# Patient Record
Sex: MALE | Race: WHITE | Employment: OTHER | URBAN - METROPOLITAN AREA
[De-identification: names, ages, dates, MRNs, and addresses within clinical notes are randomized per-mention and may not be internally consistent; named-entity substitution may affect disease eponyms.]

---

## 2018-07-24 ENCOUNTER — HOSPITAL ENCOUNTER (OUTPATIENT)
Dept: GENERAL RADIOLOGY | Age: 58
Discharge: HOME | End: 2018-07-24

## 2018-07-24 DIAGNOSIS — M70.22: ICD-10-CM

## 2018-07-25 ENCOUNTER — HOSPITAL ENCOUNTER (OUTPATIENT)
Dept: GENERAL RADIOLOGY | Age: 58
Discharge: HOME | End: 2018-07-25

## 2018-07-25 ENCOUNTER — TELEPHONE (OUTPATIENT)
Dept: ORTHOPEDIC SURGERY | Age: 58
End: 2018-07-25

## 2018-07-25 LAB
BASOPHILS ABSOLUTE: 0 K/CU MM
BASOPHILS RELATIVE PERCENT: 0.5 % (ref 0–1)
C-REACTIVE PROTEIN, HIGH SENSITIVITY: 35.4 MG/L
DIFFERENTIAL TYPE: ABNORMAL
EOSINOPHILS ABSOLUTE: 0.2 K/CU MM
EOSINOPHILS RELATIVE PERCENT: 2.4 % (ref 0–3)
ERYTHROCYTE SEDIMENTATION RATE: 13 MM/HR (ref 0–20)
HCT VFR BLD CALC: 44.3 % (ref 42–52)
HEMOGLOBIN: 14.5 GM/DL (ref 13.5–18)
IMMATURE NEUTROPHIL %: 0.7 % (ref 0–0.43)
LYMPHOCYTES ABSOLUTE: 1.6 K/CU MM
LYMPHOCYTES RELATIVE PERCENT: 21.5 % (ref 24–44)
MCH RBC QN AUTO: 30.3 PG (ref 27–31)
MCHC RBC AUTO-ENTMCNC: 32.7 % (ref 32–36)
MCV RBC AUTO: 92.7 FL (ref 78–100)
MONOCYTES ABSOLUTE: 0.6 K/CU MM
MONOCYTES RELATIVE PERCENT: 8.1 % (ref 0–4)
NUCLEATED RBC %: 0 %
PDW BLD-RTO: 11.9 % (ref 11.7–14.9)
PLATELET # BLD: 177 K/CU MM (ref 140–440)
PMV BLD AUTO: 11.8 FL (ref 7.5–11.1)
RBC # BLD: 4.78 M/CU MM (ref 4.6–6.2)
SEGMENTED NEUTROPHILS ABSOLUTE COUNT: 5 K/CU MM
SEGMENTED NEUTROPHILS RELATIVE PERCENT: 66.8 % (ref 36–66)
TOTAL IMMATURE NEUTOROPHIL: 0.05 K/CU MM
TOTAL NUCLEATED RBC: 0 K/CU MM
URIC ACID: 4.4 MG/DL (ref 3.5–7.2)
WBC # BLD: 7.5 K/CU MM (ref 4–10.5)

## 2018-07-25 NOTE — TELEPHONE ENCOUNTER
I did not see in the chart that patient was seen in the ED    If the referring provider or the patient thinks it is infected they should go to the ED to get antibiotics     If this is a chronic condition they can be seen next week with TOD Bourgeois

## 2018-08-02 ENCOUNTER — OFFICE VISIT (OUTPATIENT)
Dept: ORTHOPEDIC SURGERY | Age: 58
End: 2018-08-02

## 2018-08-02 VITALS — OXYGEN SATURATION: 97 % | HEART RATE: 70 BPM | TEMPERATURE: 98.5 F

## 2018-08-02 DIAGNOSIS — M70.22 OLECRANON BURSITIS OF LEFT ELBOW: Primary | ICD-10-CM

## 2018-08-02 PROCEDURE — 99204 OFFICE O/P NEW MOD 45 MIN: CPT | Performed by: PHYSICIAN ASSISTANT

## 2018-08-02 RX ORDER — CEFUROXIME AXETIL 500 MG/1
TABLET ORAL
Refills: 0 | COMMUNITY
Start: 2018-07-24 | End: 2022-01-18

## 2018-08-02 RX ORDER — TAMSULOSIN HYDROCHLORIDE 0.4 MG/1
CAPSULE ORAL
Refills: 3 | COMMUNITY
Start: 2018-06-14

## 2018-08-02 RX ORDER — IBUPROFEN 800 MG/1
TABLET ORAL
Refills: 0 | COMMUNITY
Start: 2018-07-24 | End: 2022-01-18

## 2018-08-02 ASSESSMENT — ENCOUNTER SYMPTOMS
EYES NEGATIVE: 1
RESPIRATORY NEGATIVE: 1
GASTROINTESTINAL NEGATIVE: 1

## 2018-08-02 NOTE — PATIENT INSTRUCTIONS
Stop Ibuprofen   Aleve as needed   Add tylenol as needed   Compressive wrap elbow as needed   Avoid direct pressure to elbow   Follow up in 2 weeks if symptoms persist     The patient was advised that NSAID-type medications have two very important potential side effects: gastrointestinal irritation including hemorrhage and renal injuries. He was asked to take the medication with food and to stop if he experiences any GI upset. I asked him to call for vomiting, abdominal pain or black/bloody stools. The patient expresses understanding of these issues and questions were answered.     Add Tylenol (also known as acetaminophen) as needed   Take 2 extra strength (500 mg) or 3 regular strength (325 mg) up to three times a day  It is OK to take Tylenol in conjunction with NSAID's (Advil, Aleve, Naprosyn, etc)  If taking other medications that have acetaminophen ensure total acetaminophen dose for any 24 period is less than 3,000 mg

## 2018-08-02 NOTE — PROGRESS NOTES
CRP/ CBC    Component Results     Component Value Ref Range & Units Status Collected Lab   Sed Rate 13  0 - 20 MM/HR Final 07/25/2018  4:12 PM [address]       Component Results     Component Value Ref Range & Units Status Collected Lab   Uric Acid 4.4  3.5 - 7.2 MG/DL Final 07/25/2018  4:12 PM [facility]      Component Results     Component Value Ref Range & Units Status Collected Lab   CRP High Sensitivity 35.4   <8.5 mg/L Final 07/25/2018  4:12 PM [address]     Component Results     Component Value Ref Range & Units Status Collected Lab   WBC 7.5  4.0 - 10.5 K/CU MM Final 07/25/2018  4:12 PM [facility] Dr LOMELI 4.78  4.6 - 6.2 M/CU MM Final 07/25/2018  4:12 PM [facility]   Hemoglobin 14.5  13.5 - 18.0 GM/DL Final 07/25/2018  4:12 PM [facility]   Hematocrit 44.3  42 - 52 % Final 07/25/2018  4:12 PM [facility]   MCV 92.7  78 - 100 FL Final 07/25/2018  4:12 PM [facility] 30.3  27 - 31 PG Final 07/25/2018  4:12 PM [facility]   MCHC 32.7  32.0 - 36.0 % Final 07/25/2018  4:12 PM [facility]   RDW 11.9  11.7 - 14.9 % Final 07/25/2018  4:12 PM [facility]   Platelets 007  735 - 440 K/CU MM Final 07/25/2018  4:12 PM [facility]    MPV 11.8   7.5 - 11.1 FL Final 07/25/2018  4:12 PM [address]   Differential Type   Final 07/25/2018  4:12 PM [facility]   AUTOMATED DIFFERENTIAL    Segs Relative 66.8   36 - 66 % Final 07/25/2018  4:12 PM [facility]   Lymphocytes % 21.5   24 - 44 % Final 07/25/2018  4:12 PM [facility]   Monocytes % 8.1   0 - 4 % Final 07/25/2018  4:12 PM [address] [place] [address] Dr   Eosinophils % 2.4  0 - 3 % Final 07/25/2018  4:12 PM [facility]   Basophils % 0.5  0 - 1 % Final 07/25/2018  4:12 PM [address]   Segs Absolute 5.0  K/CU MM Final 07/25/2018  4:12 PM [address]   Lymphocytes # 1.6  K/CU MM Final 07/25/2018  4:12 PM [address] [place] [address] Dr   Monocytes # 0.6  K/CU MM Final 07/25/2018  4:12 PM [address]   Eosinophils # 0.2  K/CU MM Final 07/25/2018  4:12 PM [address] [place] [address] Dr   Basophils # 0.0  K/CU MM Final 07/25/2018  4:12 PM [address]   Nucleated RBC % 0.0  % Final 07/25/2018  4:12 PM [address]   Total Nucleated RBC 0.0  K/CU MM Final 07/25/2018  4:12 PM [address]   Total Immature Neutrophil 0.05  K/CU MM Final 07/25/2018  4:12 PM [address]   Immature Neutrophil % 0.7   0 - 0.43 % Final 07/25/2018  4:12 PM [facility]         Impression:  left Elbow olecranon bursitis (Does not appear infected at this point)      Plan:    I explained to the patient that at this point I do not feel that making an incision or sticking a needle in this olecranon bursa is the appropriate thing to do. I felt that this was not infected and that with a bursitis generally these will improve with some compression and NSAIDs and if not he could return to talk to Dr. Boris Christiansen about surgical treatment.     Stop Ibuprofen   Aleve BID   Add tylenol as needed   Compressive wrap elbow as needed   Avoid direct pressure to elbow and avoid repetitive extension-type exercises  Follow up in 2 weeks if symptoms persist     The patient was advised that NSAID-type medications have two very important potential side effects: gastrointestinal irritation including hemorrhage and renal injuries. He was asked to take the medication with food and to stop if he experiences any GI upset. I asked him to call for vomiting, abdominal pain or black/bloody stools. The patient expresses understanding of these issues and questions were answered.     Add Tylenol (also known as acetaminophen) as needed   Take 2 extra strength (500 mg) or 3 regular strength (325 mg) up to three times a day  It is OK to take Tylenol in conjunction with NSAID's (Advil, Aleve, Naprosyn, etc)  If taking other medications that have acetaminophen ensure total acetaminophen dose for any 24 period is less than 3,000 mg

## 2022-01-18 ENCOUNTER — APPOINTMENT (OUTPATIENT)
Dept: GENERAL RADIOLOGY | Age: 62
DRG: 282 | End: 2022-01-18
Payer: COMMERCIAL

## 2022-01-18 ENCOUNTER — HOSPITAL ENCOUNTER (INPATIENT)
Age: 62
LOS: 2 days | Discharge: HOME | DRG: 282 | End: 2022-01-20
Attending: EMERGENCY MEDICINE | Admitting: STUDENT IN AN ORGANIZED HEALTH CARE EDUCATION/TRAINING PROGRAM
Payer: COMMERCIAL

## 2022-01-18 ENCOUNTER — APPOINTMENT (OUTPATIENT)
Dept: NUCLEAR MEDICINE | Age: 62
DRG: 282 | End: 2022-01-18
Payer: COMMERCIAL

## 2022-01-18 DIAGNOSIS — R07.9 ACUTE CHEST PAIN: Primary | ICD-10-CM

## 2022-01-18 PROBLEM — H90.3 SENSORINEURAL HEARING LOSS, BILATERAL: Status: ACTIVE | Noted: 2017-02-09

## 2022-01-18 PROBLEM — I20.0 UNSTABLE ANGINA (HCC): Status: ACTIVE | Noted: 2022-01-18

## 2022-01-18 PROBLEM — H93.19 TINNITUS: Status: ACTIVE | Noted: 2017-02-09

## 2022-01-18 LAB
ALBUMIN SERPL-MCNC: 3.9 GM/DL (ref 3.4–5)
ALP BLD-CCNC: 68 IU/L (ref 40–128)
ALT SERPL-CCNC: 23 U/L (ref 10–40)
ANION GAP SERPL CALCULATED.3IONS-SCNC: 9 MMOL/L (ref 4–16)
AST SERPL-CCNC: 29 IU/L (ref 15–37)
BASOPHILS ABSOLUTE: 0.1 K/CU MM
BASOPHILS RELATIVE PERCENT: 0.6 % (ref 0–1)
BILIRUB SERPL-MCNC: 0.2 MG/DL (ref 0–1)
BUN BLDV-MCNC: 27 MG/DL (ref 6–23)
CALCIUM SERPL-MCNC: 8.9 MG/DL (ref 8.3–10.6)
CHLORIDE BLD-SCNC: 103 MMOL/L (ref 99–110)
CHOLESTEROL: 180 MG/DL
CO2: 25 MMOL/L (ref 21–32)
CREAT SERPL-MCNC: 1.1 MG/DL (ref 0.9–1.3)
DIFFERENTIAL TYPE: ABNORMAL
EKG ATRIAL RATE: 48 BPM
EKG ATRIAL RATE: 52 BPM
EKG DIAGNOSIS: NORMAL
EKG DIAGNOSIS: NORMAL
EKG P AXIS: 36 DEGREES
EKG P AXIS: 48 DEGREES
EKG P-R INTERVAL: 176 MS
EKG P-R INTERVAL: 176 MS
EKG Q-T INTERVAL: 436 MS
EKG Q-T INTERVAL: 438 MS
EKG QRS DURATION: 78 MS
EKG QRS DURATION: 82 MS
EKG QTC CALCULATION (BAZETT): 391 MS
EKG QTC CALCULATION (BAZETT): 405 MS
EKG R AXIS: 20 DEGREES
EKG R AXIS: 35 DEGREES
EKG T AXIS: 36 DEGREES
EKG T AXIS: 58 DEGREES
EKG VENTRICULAR RATE: 48 BPM
EKG VENTRICULAR RATE: 52 BPM
EOSINOPHILS ABSOLUTE: 0.2 K/CU MM
EOSINOPHILS RELATIVE PERCENT: 2.9 % (ref 0–3)
ESTIMATED AVERAGE GLUCOSE: 100 MG/DL
GFR AFRICAN AMERICAN: >60 ML/MIN/1.73M2
GFR NON-AFRICAN AMERICAN: >60 ML/MIN/1.73M2
GLUCOSE BLD-MCNC: 113 MG/DL (ref 70–99)
HBA1C MFR BLD: 5.1 % (ref 4.2–6.3)
HCT VFR BLD CALC: 44.4 % (ref 42–52)
HDLC SERPL-MCNC: 52 MG/DL
HEMOGLOBIN: 14.9 GM/DL (ref 13.5–18)
IMMATURE NEUTROPHIL %: 0.4 % (ref 0–0.43)
LDL CHOLESTEROL DIRECT: 113 MG/DL
LIPASE: 31 IU/L (ref 13–60)
LV EF: 53 %
LV EF: 60 %
LVEF MODALITY: NORMAL
LVEF MODALITY: NORMAL
LYMPHOCYTES ABSOLUTE: 1.9 K/CU MM
LYMPHOCYTES RELATIVE PERCENT: 23.7 % (ref 24–44)
MCH RBC QN AUTO: 29.9 PG (ref 27–31)
MCHC RBC AUTO-ENTMCNC: 33.6 % (ref 32–36)
MCV RBC AUTO: 89 FL (ref 78–100)
MONOCYTES ABSOLUTE: 0.5 K/CU MM
MONOCYTES RELATIVE PERCENT: 6.7 % (ref 0–4)
NUCLEATED RBC %: 0 %
PDW BLD-RTO: 12.7 % (ref 11.7–14.9)
PLATELET # BLD: 161 K/CU MM (ref 140–440)
PMV BLD AUTO: 11.9 FL (ref 7.5–11.1)
POTASSIUM SERPL-SCNC: 3.7 MMOL/L (ref 3.5–5.1)
RBC # BLD: 4.99 M/CU MM (ref 4.6–6.2)
SARS-COV-2, NAAT: NOT DETECTED
SEGMENTED NEUTROPHILS ABSOLUTE COUNT: 5.2 K/CU MM
SEGMENTED NEUTROPHILS RELATIVE PERCENT: 65.7 % (ref 36–66)
SODIUM BLD-SCNC: 137 MMOL/L (ref 135–145)
SOURCE: NORMAL
TOTAL IMMATURE NEUTOROPHIL: 0.03 K/CU MM
TOTAL NUCLEATED RBC: 0 K/CU MM
TOTAL PROTEIN: 6.1 GM/DL (ref 6.4–8.2)
TRIGL SERPL-MCNC: 87 MG/DL
TROPONIN T: 0.02 NG/ML
TROPONIN T: <0.01 NG/ML
WBC # BLD: 7.9 K/CU MM (ref 4–10.5)

## 2022-01-18 PROCEDURE — 78452 HT MUSCLE IMAGE SPECT MULT: CPT

## 2022-01-18 PROCEDURE — 3430000000 HC RX DIAGNOSTIC RADIOPHARMACEUTICAL: Performed by: INTERNAL MEDICINE

## 2022-01-18 PROCEDURE — 84484 ASSAY OF TROPONIN QUANT: CPT

## 2022-01-18 PROCEDURE — 83721 ASSAY OF BLOOD LIPOPROTEIN: CPT

## 2022-01-18 PROCEDURE — A9500 TC99M SESTAMIBI: HCPCS | Performed by: INTERNAL MEDICINE

## 2022-01-18 PROCEDURE — C1769 GUIDE WIRE: HCPCS

## 2022-01-18 PROCEDURE — 83036 HEMOGLOBIN GLYCOSYLATED A1C: CPT

## 2022-01-18 PROCEDURE — 96366 THER/PROPH/DIAG IV INF ADDON: CPT

## 2022-01-18 PROCEDURE — 6360000002 HC RX W HCPCS: Performed by: INTERNAL MEDICINE

## 2022-01-18 PROCEDURE — 6360000002 HC RX W HCPCS

## 2022-01-18 PROCEDURE — 6370000000 HC RX 637 (ALT 250 FOR IP): Performed by: EMERGENCY MEDICINE

## 2022-01-18 PROCEDURE — C1887 CATHETER, GUIDING: HCPCS

## 2022-01-18 PROCEDURE — 83690 ASSAY OF LIPASE: CPT

## 2022-01-18 PROCEDURE — 2709999900 HC NON-CHARGEABLE SUPPLY

## 2022-01-18 PROCEDURE — 85025 COMPLETE CBC W/AUTO DIFF WBC: CPT

## 2022-01-18 PROCEDURE — 2500000003 HC RX 250 WO HCPCS: Performed by: EMERGENCY MEDICINE

## 2022-01-18 PROCEDURE — B2111ZZ FLUOROSCOPY OF MULTIPLE CORONARY ARTERIES USING LOW OSMOLAR CONTRAST: ICD-10-PCS | Performed by: INTERNAL MEDICINE

## 2022-01-18 PROCEDURE — 80053 COMPREHEN METABOLIC PANEL: CPT

## 2022-01-18 PROCEDURE — 71045 X-RAY EXAM CHEST 1 VIEW: CPT

## 2022-01-18 PROCEDURE — 93306 TTE W/DOPPLER COMPLETE: CPT

## 2022-01-18 PROCEDURE — 80061 LIPID PANEL: CPT

## 2022-01-18 PROCEDURE — 93010 ELECTROCARDIOGRAM REPORT: CPT | Performed by: INTERNAL MEDICINE

## 2022-01-18 PROCEDURE — 96365 THER/PROPH/DIAG IV INF INIT: CPT

## 2022-01-18 PROCEDURE — 93005 ELECTROCARDIOGRAM TRACING: CPT | Performed by: EMERGENCY MEDICINE

## 2022-01-18 PROCEDURE — 99222 1ST HOSP IP/OBS MODERATE 55: CPT | Performed by: INTERNAL MEDICINE

## 2022-01-18 PROCEDURE — 99285 EMERGENCY DEPT VISIT HI MDM: CPT

## 2022-01-18 PROCEDURE — 6360000004 HC RX CONTRAST MEDICATION

## 2022-01-18 PROCEDURE — 93454 CORONARY ARTERY ANGIO S&I: CPT | Performed by: INTERNAL MEDICINE

## 2022-01-18 PROCEDURE — 2580000003 HC RX 258: Performed by: INTERNAL MEDICINE

## 2022-01-18 PROCEDURE — 6370000000 HC RX 637 (ALT 250 FOR IP): Performed by: STUDENT IN AN ORGANIZED HEALTH CARE EDUCATION/TRAINING PROGRAM

## 2022-01-18 PROCEDURE — 87635 SARS-COV-2 COVID-19 AMP PRB: CPT

## 2022-01-18 PROCEDURE — 6370000000 HC RX 637 (ALT 250 FOR IP): Performed by: INTERNAL MEDICINE

## 2022-01-18 PROCEDURE — 93017 CV STRESS TEST TRACING ONLY: CPT

## 2022-01-18 PROCEDURE — C1894 INTRO/SHEATH, NON-LASER: HCPCS

## 2022-01-18 PROCEDURE — 93005 ELECTROCARDIOGRAM TRACING: CPT | Performed by: INTERNAL MEDICINE

## 2022-01-18 PROCEDURE — 94761 N-INVAS EAR/PLS OXIMETRY MLT: CPT

## 2022-01-18 PROCEDURE — 93454 CORONARY ARTERY ANGIO S&I: CPT

## 2022-01-18 PROCEDURE — 2500000003 HC RX 250 WO HCPCS

## 2022-01-18 PROCEDURE — 2580000003 HC RX 258: Performed by: STUDENT IN AN ORGANIZED HEALTH CARE EDUCATION/TRAINING PROGRAM

## 2022-01-18 PROCEDURE — 2140000000 HC CCU INTERMEDIATE R&B

## 2022-01-18 RX ORDER — ONDANSETRON 2 MG/ML
4 INJECTION INTRAMUSCULAR; INTRAVENOUS EVERY 6 HOURS PRN
Status: DISCONTINUED | OUTPATIENT
Start: 2022-01-18 | End: 2022-01-20 | Stop reason: HOSPADM

## 2022-01-18 RX ORDER — TAMSULOSIN HYDROCHLORIDE 0.4 MG/1
0.4 CAPSULE ORAL DAILY
Status: DISCONTINUED | OUTPATIENT
Start: 2022-01-18 | End: 2022-01-20 | Stop reason: HOSPADM

## 2022-01-18 RX ORDER — HEPARIN SODIUM 10000 [USP'U]/100ML
1000 INJECTION, SOLUTION INTRAVENOUS CONTINUOUS
Status: DISCONTINUED | OUTPATIENT
Start: 2022-01-18 | End: 2022-01-19

## 2022-01-18 RX ORDER — SODIUM CHLORIDE 0.9 % (FLUSH) 0.9 %
5-40 SYRINGE (ML) INJECTION EVERY 12 HOURS SCHEDULED
Status: DISCONTINUED | OUTPATIENT
Start: 2022-01-18 | End: 2022-01-20 | Stop reason: HOSPADM

## 2022-01-18 RX ORDER — ACETAMINOPHEN 325 MG/1
650 TABLET ORAL EVERY 6 HOURS PRN
Status: DISCONTINUED | OUTPATIENT
Start: 2022-01-18 | End: 2022-01-18 | Stop reason: SDUPTHER

## 2022-01-18 RX ORDER — MAGNESIUM HYDROXIDE/ALUMINUM HYDROXICE/SIMETHICONE 120; 1200; 1200 MG/30ML; MG/30ML; MG/30ML
30 SUSPENSION ORAL ONCE
Status: COMPLETED | OUTPATIENT
Start: 2022-01-18 | End: 2022-01-18

## 2022-01-18 RX ORDER — ASPIRIN 81 MG/1
81 TABLET, CHEWABLE ORAL DAILY
Status: DISCONTINUED | OUTPATIENT
Start: 2022-01-19 | End: 2022-01-20 | Stop reason: HOSPADM

## 2022-01-18 RX ORDER — POLYETHYLENE GLYCOL 3350 17 G/17G
17 POWDER, FOR SOLUTION ORAL DAILY PRN
Status: DISCONTINUED | OUTPATIENT
Start: 2022-01-18 | End: 2022-01-20 | Stop reason: HOSPADM

## 2022-01-18 RX ORDER — ACETAMINOPHEN 325 MG/1
650 TABLET ORAL EVERY 4 HOURS PRN
Status: DISCONTINUED | OUTPATIENT
Start: 2022-01-18 | End: 2022-01-20 | Stop reason: HOSPADM

## 2022-01-18 RX ORDER — NITROGLYCERIN 20 MG/100ML
5-200 INJECTION INTRAVENOUS CONTINUOUS
Status: DISCONTINUED | OUTPATIENT
Start: 2022-01-18 | End: 2022-01-20 | Stop reason: HOSPADM

## 2022-01-18 RX ORDER — MORPHINE SULFATE 2 MG/ML
1 INJECTION, SOLUTION INTRAMUSCULAR; INTRAVENOUS EVERY 4 HOURS PRN
Status: DISCONTINUED | OUTPATIENT
Start: 2022-01-18 | End: 2022-01-20 | Stop reason: HOSPADM

## 2022-01-18 RX ORDER — ASPIRIN 81 MG/1
81 TABLET, CHEWABLE ORAL DAILY
Status: DISCONTINUED | OUTPATIENT
Start: 2022-01-19 | End: 2022-01-18 | Stop reason: SDUPTHER

## 2022-01-18 RX ORDER — SODIUM CHLORIDE 0.9 % (FLUSH) 0.9 %
5-40 SYRINGE (ML) INJECTION EVERY 12 HOURS SCHEDULED
Status: DISCONTINUED | OUTPATIENT
Start: 2022-01-18 | End: 2022-01-18 | Stop reason: SDUPTHER

## 2022-01-18 RX ORDER — HEPARIN SODIUM 10000 [USP'U]/100ML
1000 INJECTION, SOLUTION INTRAVENOUS CONTINUOUS
Status: DISCONTINUED | OUTPATIENT
Start: 2022-01-18 | End: 2022-01-18 | Stop reason: SDUPTHER

## 2022-01-18 RX ORDER — SODIUM CHLORIDE 0.9 % (FLUSH) 0.9 %
5-40 SYRINGE (ML) INJECTION PRN
Status: DISCONTINUED | OUTPATIENT
Start: 2022-01-18 | End: 2022-01-18 | Stop reason: SDUPTHER

## 2022-01-18 RX ORDER — SODIUM CHLORIDE 9 MG/ML
INJECTION, SOLUTION INTRAVENOUS CONTINUOUS
Status: DISCONTINUED | OUTPATIENT
Start: 2022-01-18 | End: 2022-01-19

## 2022-01-18 RX ORDER — ONDANSETRON 4 MG/1
4 TABLET, ORALLY DISINTEGRATING ORAL EVERY 8 HOURS PRN
Status: DISCONTINUED | OUTPATIENT
Start: 2022-01-18 | End: 2022-01-20 | Stop reason: HOSPADM

## 2022-01-18 RX ORDER — SODIUM CHLORIDE 0.9 % (FLUSH) 0.9 %
5-40 SYRINGE (ML) INJECTION PRN
Status: DISCONTINUED | OUTPATIENT
Start: 2022-01-18 | End: 2022-01-20 | Stop reason: HOSPADM

## 2022-01-18 RX ORDER — FAMOTIDINE 20 MG/1
20 TABLET, FILM COATED ORAL ONCE
Status: COMPLETED | OUTPATIENT
Start: 2022-01-18 | End: 2022-01-18

## 2022-01-18 RX ORDER — AMINOPHYLLINE DIHYDRATE 25 MG/ML
75 INJECTION, SOLUTION INTRAVENOUS ONCE
Status: COMPLETED | OUTPATIENT
Start: 2022-01-18 | End: 2022-01-18

## 2022-01-18 RX ORDER — ATORVASTATIN CALCIUM 80 MG/1
80 TABLET, FILM COATED ORAL NIGHTLY
Status: DISCONTINUED | OUTPATIENT
Start: 2022-01-18 | End: 2022-01-20 | Stop reason: HOSPADM

## 2022-01-18 RX ORDER — ACETAMINOPHEN 650 MG/1
650 SUPPOSITORY RECTAL EVERY 6 HOURS PRN
Status: DISCONTINUED | OUTPATIENT
Start: 2022-01-18 | End: 2022-01-20 | Stop reason: HOSPADM

## 2022-01-18 RX ORDER — SODIUM CHLORIDE 9 MG/ML
25 INJECTION, SOLUTION INTRAVENOUS PRN
Status: DISCONTINUED | OUTPATIENT
Start: 2022-01-18 | End: 2022-01-18 | Stop reason: SDUPTHER

## 2022-01-18 RX ORDER — HEPARIN SODIUM 10000 [USP'U]/100ML
1000 INJECTION, SOLUTION INTRAVENOUS CONTINUOUS
Status: DISCONTINUED | OUTPATIENT
Start: 2022-01-18 | End: 2022-01-18

## 2022-01-18 RX ORDER — SODIUM CHLORIDE 9 MG/ML
25 INJECTION, SOLUTION INTRAVENOUS PRN
Status: DISCONTINUED | OUTPATIENT
Start: 2022-01-18 | End: 2022-01-20 | Stop reason: HOSPADM

## 2022-01-18 RX ADMIN — HEPARIN SODIUM AND DEXTROSE 1000 UNITS/HR: 10000; 5 INJECTION INTRAVENOUS at 16:32

## 2022-01-18 RX ADMIN — TIROFIBAN 0.15 MCG/KG/MIN: 5 INJECTION, SOLUTION INTRAVENOUS at 16:26

## 2022-01-18 RX ADMIN — SODIUM CHLORIDE, PRESERVATIVE FREE 10 ML: 5 INJECTION INTRAVENOUS at 09:03

## 2022-01-18 RX ADMIN — NITROGLYCERIN 5 MCG/MIN: 20 INJECTION INTRAVENOUS at 04:44

## 2022-01-18 RX ADMIN — ALUMINUM HYDROXIDE, MAGNESIUM HYDROXIDE, AND SIMETHICONE 30 ML: 200; 200; 20 SUSPENSION ORAL at 03:46

## 2022-01-18 RX ADMIN — KIT FOR THE PREPARATION OF TECHNETIUM TC99M SESTAMIBI 30 MILLICURIE: 1 INJECTION, POWDER, LYOPHILIZED, FOR SOLUTION PARENTERAL at 08:50

## 2022-01-18 RX ADMIN — FAMOTIDINE 20 MG: 20 TABLET, FILM COATED ORAL at 03:46

## 2022-01-18 RX ADMIN — KIT FOR THE PREPARATION OF TECHNETIUM TC99M SESTAMIBI 10 MILLICURIE: 1 INJECTION, POWDER, LYOPHILIZED, FOR SOLUTION PARENTERAL at 07:15

## 2022-01-18 RX ADMIN — ONDANSETRON 4 MG: 4 TABLET, ORALLY DISINTEGRATING ORAL at 06:22

## 2022-01-18 RX ADMIN — SODIUM CHLORIDE: 9 INJECTION, SOLUTION INTRAVENOUS at 16:25

## 2022-01-18 RX ADMIN — ATORVASTATIN CALCIUM 80 MG: 80 TABLET, FILM COATED ORAL at 20:07

## 2022-01-18 RX ADMIN — AMINOPHYLLINE 75 MG: 25 INJECTION, SOLUTION INTRAVENOUS at 08:50

## 2022-01-18 RX ADMIN — TIROFIBAN 0.15 MCG/KG/MIN: 5 INJECTION, SOLUTION INTRAVENOUS at 18:36

## 2022-01-18 RX ADMIN — REGADENOSON 0.4 MG: 0.08 INJECTION, SOLUTION INTRAVENOUS at 08:46

## 2022-01-18 ASSESSMENT — PAIN SCALES - GENERAL
PAINLEVEL_OUTOF10: 5
PAINLEVEL_OUTOF10: 0

## 2022-01-18 ASSESSMENT — ENCOUNTER SYMPTOMS
DIARRHEA: 0
COUGH: 0
CHEST TIGHTNESS: 0
SHORTNESS OF BREATH: 1
CHEST TIGHTNESS: 1
COLOR CHANGE: 0
NAUSEA: 1
NAUSEA: 0
ABDOMINAL PAIN: 0
RECTAL PAIN: 0
RHINORRHEA: 0
SORE THROAT: 0
BLOOD IN STOOL: 0
VOMITING: 0
WHEEZING: 0

## 2022-01-18 ASSESSMENT — PAIN DESCRIPTION - ORIENTATION: ORIENTATION: MID

## 2022-01-18 ASSESSMENT — HEART SCORE: ECG: 0

## 2022-01-18 ASSESSMENT — PAIN DESCRIPTION - FREQUENCY: FREQUENCY: CONTINUOUS

## 2022-01-18 ASSESSMENT — PAIN DESCRIPTION - LOCATION: LOCATION: CHEST

## 2022-01-18 NOTE — H&P
History and Physical      Name:  Laura Delarosa /Age/Sex: 1960  (64 y.o. male)   MRN & CSN:  [id number] & [id number] Admission Date/Time: 2022  2:56 AM   Location:  [place] [address] PCP: No primary care provider on file. Hospital Day: 1    Assessment and Plan:   Laura Delarosa is a 64 y.o.  male  who presents with Unstable angina ([facility]    1. Chest pain concerning for unstable angina, rule out acute coronary syndrome, without h/o coronary artery disease  -Admit to observation unit with telemetry  -Story concerning for unstable angina  -Troponin <0.010 in ED, Cycle troponin x2 q6  -Start patient on aspirin and statin  -Check lipid panel and hemoglobin A1c  -Continue nitro infusion from ED  -Morphine as needed pain  -Nurse communication to notify if any continued chest pain after nitro infusion  -Nasal cannula oxygen prn  -N. p.o and strict bedrest  -2D Echo in AM  -Consult cardiology, appreciate recommendations    Other chronic medical conditions:   Continue all home meds except stated above or contraindicated.  BPH    Diet Diet NPO   DVT Prophylaxis [x] Lovenox, []  Heparin, [] SCDs, [] Ambulation   GI Prophylaxis [] PPI,  [] H2 Blocker,  [] Carafate,  [] Diet/Tube Feeds   Code Status Full Code   Disposition Patient requires continued admission due to Unstable angina ([facility]   MDM [] Low, [] Moderate,[x]  High  Patient's risk as above due to acuity of condition with potential for decompensation. History of Present Illness:     Chief Complaint: Unstable angina ([facility]    Laura Delarosa is a 64 y.o.  male with a reviewed and a noncontributory family history and a PMH as stated above, who presents with complaints of chest pain. Onset was 4 hours PTA, with unchanged course since that time. The patient describes the pain as constant, pressure like in nature, radiates to the left arm and right arm. Patient rates pain as a 6/10 in intensity.   Associated symptoms are dyspnea and diaphresis. Aggravating factors are none. Mild alleviating factors are: nitroglycerin 1 tablets and NSAIDs. Patient's cardiac risk factors are advanced age (older than 54 for men, 72 for women) and dyslipidemia. Patient's risk factors for DVT/PE: none. Previous cardiac testing: Patient states he had a stress test \"many years ago, maybe like 10, because I wanted to start going running and it was completely normal.\"  Otherwise patient denies fevers, cough, abdominal pain, nausea, vomiting, diarrhea, melena, hematochezia, dysuria, frequency, or urgency. Discussed case with ED provider. ROS:   Review of Systems   Constitutional: Positive for diaphoresis. Negative for appetite change, fatigue and fever. HENT: Negative for congestion, rhinorrhea and sore throat. Eyes: Negative for visual disturbance. Respiratory: Positive for chest tightness and shortness of breath. Negative for cough and wheezing. Cardiovascular: Positive for chest pain. Negative for palpitations and leg swelling. Gastrointestinal: Negative for abdominal pain, blood in stool, diarrhea, nausea, rectal pain and vomiting. Genitourinary: Negative for dysuria, frequency, hematuria and urgency. Musculoskeletal: Negative for arthralgias. Skin: Negative for color change and rash. Neurological: Negative for dizziness, seizures, weakness, numbness and headaches. Psychiatric/Behavioral: Negative for confusion. Objective:   No intake or output data in the 24 hours ending 01/18/22 0504   Vitals:   Vitals:    01/18/22 0348   BP: 118/75   Pulse: 57   Resp: 18   Temp:    SpO2: 99%     /75   Pulse 57   Temp 98.4 °F (36.9 °C) (Oral)   Resp 18   Ht 5' 6\" (1.676 m)   Wt 160 lb (72.6 kg)   SpO2 99%   BMI 25.82 kg/m²   Physical Exam:   Physical Exam  Vitals and nursing note reviewed. Constitutional:       General: He is awake. He is not in acute distress. Appearance: Normal appearance. He is normal weight.  He is ill-appearing and diaphoretic. He is not toxic-appearing. Interventions: He is not intubated. Comments: Appears uncomfortable and in pain   HENT:      Head: Atraumatic. Right Ear: External ear normal.      Left Ear: External ear normal.      Nose: Nose normal. No rhinorrhea. Mouth/Throat:      Mouth: Mucous membranes are moist.   Eyes:      General: No scleral icterus. Conjunctiva/sclera: Conjunctivae normal.   Cardiovascular:      Rate and Rhythm: Regular rhythm. Bradycardia present. Pulses: Normal pulses. Heart sounds: Normal heart sounds. No murmur heard. No gallop. Pulmonary:      Effort: Pulmonary effort is normal. No tachypnea or respiratory distress. He is not intubated. Breath sounds: Normal breath sounds. No wheezing, rhonchi or rales. Abdominal:      General: Abdomen is flat. Bowel sounds are normal. There is no distension. Palpations: Abdomen is soft. Tenderness: There is no abdominal tenderness. There is no guarding. Musculoskeletal:         General: Normal range of motion. Cervical back: Neck supple. Right lower leg: No edema. Left lower leg: No edema. Skin:     General: Skin is cool and moist.      Capillary Refill: Capillary refill takes less than 2 seconds. Neurological:      Mental Status: He is alert and oriented to person, place, and time. Mental status is at baseline. Cranial Nerves: No dysarthria or facial asymmetry. Motor: No tremor or seizure activity. Psychiatric:         Attention and Perception: He is attentive. Mood and Affect: Mood is anxious. Speech: He is communicative. Behavior: Behavior is cooperative. Past Medical History:      Past Medical History:   Diagnosis Date    BPH (benign prostatic hyperplasia)     HLD (hyperlipidemia)      PSHX:  has a past surgical history that includes knee surgery.     Allergies: No Known Allergies    FAM HX: family history includes Clotting Disorder in his father; Dementia in his mother; Prostate Cancer in his father. Soc HX:   Social History     Socioeconomic History    Marital status: Single     Spouse name: None    Number of children: None    Years of education: None    Highest education level: None   Occupational History    None   Tobacco Use    Smoking status: Never Smoker    Smokeless tobacco: Never Used   Substance and Sexual Activity    Alcohol use: Not Currently     Alcohol/week: 3.0 standard drinks     Types: 3 Cans of beer per week    Drug use: Never    Sexual activity: None   Other Topics Concern    None   Social History Narrative    None     Social Determinants of Health     Financial Resource Strain:     Difficulty of Paying Living Expenses: Not on file   Food Insecurity:     Worried About Running Out of Food in the Last Year: Not on file    Susy of Food in the Last Year: Not on file   Transportation Needs:     Lack of Transportation (Medical): Not on file    Lack of Transportation (Non-Medical):  Not on file   Physical Activity:     Days of Exercise per Week: Not on file    Minutes of Exercise per Session: Not on file   Stress:     Feeling of Stress : Not on file   Social Connections:     Frequency of Communication with Friends and Family: Not on file    Frequency of Social Gatherings with Friends and Family: Not on file    Attends Jainism Services: Not on file    Active Member of [address] [place] [address] Here@ Networks or Organizations: Not on file    Attends Club or Organization Meetings: Not on file    Marital Status: Not on file   Intimate Partner Violence:     Fear of Current or Ex-Partner: Not on file    Emotionally Abused: Not on file    Physically Abused: Not on file    Sexually Abused: Not on file   Housing Stability:     Unable to Pay for Housing in the Last Year: Not on file    Number of [place] in the Last Year: Not on file    Unstable Housing in the Last Year: Not on file       Data:   CBC with Differential:    Lab Results   Component Value Date    WBC 7.9 01/18/2022    RBC 4.99 01/18/2022    HGB 14.9 01/18/2022    HCT 44.4 01/18/2022     01/18/2022    MCV 89.0 01/18/2022    MCH 29.9 01/18/2022    MCHC 33.6 01/18/2022    RDW 12.7 01/18/2022    SEGSPCT 65.7 01/18/2022    LYMPHOPCT 23.7 01/18/2022    MONOPCT 6.7 01/18/2022    BASOPCT 0.6 01/18/2022    MONOSABS 0.5 01/18/2022    LYMPHSABS 1.9 01/18/2022    EOSABS 0.2 01/18/2022    BASOSABS 0.1 01/18/2022    DIFFTYPE AUTOMATED DIFFERENTIAL 01/18/2022       CMP:     Lab Results   Component Value Date     01/18/2022    K 3.7 01/18/2022     01/18/2022    CO2 25 01/18/2022    BUN 27 01/18/2022    CREATININE 1.1 01/18/2022    GFRAA >60 01/18/2022    LABGLOM >60 01/18/2022    GLUCOSE 113 01/18/2022    PROT 6.1 01/18/2022    LABALBU 3.9 01/18/2022    CALCIUM 8.9 01/18/2022    BILITOT 0.2 01/18/2022    ALKPHOS 68 01/18/2022    AST 29 01/18/2022    ALT 23 01/18/2022       Troponin:  Lab Results   Component Value Date    TROPONINT <0.010 01/18/2022     Radiology results:  XR CHEST PORTABLE   Preliminary Result   1. Opacity adjacent to the right heart border which may represent infection   or atelectasis. Medications:   Home Medications:   Prior to Admission medications    Medication Sig Start Date End Date Taking?  Authorizing Provider   cefUROXime (CEFTIN) 500 MG tablet TAKE 1 TABLET BY MOUTH TWICE A DAY 7/24/18   Historical Provider, MD   tamsulosin (FLOMAX) 0.4 MG capsule TAKE ONE CAPSULE BY MOUTH 1/2 HOUR FOLLOWING THE SAME MEAL EACH DAY 6/14/18   Historical Provider, MD   ibuprofen (ADVIL;MOTRIN) 800 MG tablet TAKE 1 TABLET BY MOUTH EVERY 6 TO 8 HOURS AS NEEDED WITH FOOD 7/24/18   Historical Provider, MD     Medications:    tamsulosin  0.4 mg Oral Daily    sodium chloride flush  5-40 mL IntraVENous 2 times per day    [START ON 1/19/2022] aspirin  81 mg Oral Daily    atorvastatin  80 mg Oral Nightly    enoxaparin  40 mg SubCUTAneous Daily      Infusions:    nitroGLYCERIN 5 mcg/min (01/18/22 0444)    sodium chloride       PRN Meds: sodium chloride flush, 5-40 mL, PRN  sodium chloride, 25 mL, PRN  ondansetron, 4 mg, Q8H PRN   Or  ondansetron, 4 mg, Q6H PRN  acetaminophen, 650 mg, Q6H PRN   Or  acetaminophen, 650 mg, Q6H PRN  polyethylene glycol, 17 g, Daily PRN  morphine, 1 mg, Q4H PRN        Electronically signed by Aicha Smart DO on 1/18/2022 at 5:04 AM      This dictation was created with voice recognition software. While attempts have been made to review the dictation as it is transcribed, on occasion the spoken word can be misinterpreted by the technology leading to omissions or inappropriate words, phrases or sentences.

## 2022-01-18 NOTE — PROGRESS NOTES
ANEURYSMAL ARTERIES  LAD 50% MID  RCA ANEURYSMAL  PL FILL FROM [facility]LL[facility]LS    AGGRASTAT AND HEPARIN  RECHECK IN AM

## 2022-01-18 NOTE — CONSULTS
Chart reviewed  Full note to follow  Pt has been having CP  Trop mildly elevated  cardiolite ?  Inf wall ischemia  [facility] today  Risks, benefits and alternatives explained to pt  Consent obtained

## 2022-01-18 NOTE — PROGRESS NOTES
Hospitalist Progress Note      Name:  Bakari Coleman /Age/Sex: 1960  (64 y.o. male)   MRN & CSN:  [id number] & [id number] Admission Date/Time: 2022  2:56 AM   Location:  [address] [place] [address] PCP: No primary care provider on file. Hospital Day: 1    Assessment and Plan:   Bakari Coleman is a 64 y.o.  male  who presents with Unstable angina ([address]    1. Chest pain  1. Troponin () 0.010--->0.022  2. EKG shows sinus bradycardia  3. Echo () shows an EF of 50-55% with mild mitral regurgitation  4. Stress test () shows a small sized defect of mild severity which is reversible involving lateral wall of mycardium. abnormal lexiscan nuclear scintigraphic study suggestive of abnormal myocardial perfusion. Mild degree of lateral wall ischemia noted involving a small sized area of left ventricular myocardium  5. LHC () shows severe PL-RCA disease, 50% MID LAD, anuerysmal arteries   6. Repeat LHC on   7. Aggrestat and Heparin drip     2. Hyperlipidemia        1. Continue lipitor     Diet ADULT DIET; Regular; Low Fat/Low Chol/High Fiber/ABAHY   DVT Prophylaxis [] Lovenox, []  Heparin, [] SCDs, [] Ambulation   GI Prophylaxis [] PPI,  [] H2 Blocker,  [] Carafate,  [] Diet/Tube Feeds   Code Status Full Code   Disposition Patient requires continued admission due to ongoing cardiac work up         History of Present Illness:     Chief Complaint: Unstable angina ([address]  Bakari Coleman is a 64 y.o.  male  who presents with chest pain in his chest upon waking up at 0300. Pt states that he woke up with the pain and then had a cold sweat. 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 Pt denies being a smoker, denies any alcohol or any drug use.       Ten point ROS reviewed negative, unless as noted above    Objective:   No intake or output data in the 24 hours ending 22 1550   Vitals:   Vitals:    22 1441   BP: 97/63   Pulse: 55 Resp: 15   Temp: 98.3 °F (36.8 °C)   SpO2: 99%     Physical Exam:   GEN Awake male, sitting upright in bed in no apparent distress. Appears given age. EYES Pupils are equally round. No scleral erythema, discharge, or conjunctivitis. HENT Mucous membranes are moist. Oral pharynx without exudates, no evidence of thrush. NECK Supple, no apparent thyromegaly or masses. RESP Clear to auscultation, no wheezes, rales or rhonchi. Symmetric chest movement while on room air. CARDIO/VASC S1/S2 auscultated. Regular rate without appreciable murmurs, rubs, or gallops. No JVD or carotid bruits. Peripheral pulses equal bilaterally and palpable. No peripheral edema. GI Abdomen is soft without significant tenderness, masses, or guarding. Bowel sounds are normoactive. Rectal exam deferred.  No costovertebral angle tenderness. Damian catheter is not present. HEME/LYMPH No palpable cervical lymphadenopathy and no hepatosplenomegaly. No petechiae or ecchymoses. MSK No gross joint deformities. SKIN Normal coloration, warm, dry. Arm board and kerlex to right wrist, dressing is intact and dry. NEURO Cranial nerves appear grossly intact, normal speech, no lateralizing weakness. PSYCH Awake, alert, oriented x 4. Affect appropriate.     Medications:   Medications:    tamsulosin  0.4 mg Oral Daily    atorvastatin  80 mg Oral Nightly    sodium chloride flush  5-40 mL IntraVENous 2 times per day    metoprolol tartrate  25 mg Oral BID    [START ON 1/19/2022] aspirin  81 mg Oral Daily    tirofiban  25 mcg/kg IntraVENous Once      Infusions:    nitroGLYCERIN Stopped (01/18/22 0629)    tirofiban      sodium chloride      sodium chloride      tirofiban      heparin (PORCINE) Infusion       PRN Meds: ondansetron, 4 mg, Q8H PRN   Or  ondansetron, 4 mg, Q6H PRN  acetaminophen, 650 mg, Q6H PRN  polyethylene glycol, 17 g, Daily PRN  morphine, 1 mg, Q4H PRN  sodium chloride flush, 5-40 mL, PRN  sodium chloride, 25 mL, PRN  acetaminophen, 650 mg, Q4H PRN          Patient is still admitted because ongoing cardiac work up. The anticipated discharge is in greater than 24 hours.      Electronically signed by ANGÉLICA Kebede CNP on 1/18/2022 at 3:50 PM

## 2022-01-18 NOTE — CONSULTS
Adeel Saez [address], [address] [facility],Third Floor  Phone: [phone]    Fax [phone]                  Ronnie Mora MD, Leda Mancilla MD, [address], MD, MD Ezra Alexander MD Burnette Opoka, MD Gem Munoz, MD Tommie Shepherd, ANGÉLICA Mcleod, APRN Romayne Areola, APRDIAZ Guerrero, APRN  Pool Rodrigeuz PA-C    CARDIOLOGY CONSULT NOTE     Reason for consultation: Chest pain rule out ACS    Referring physician:  Triny Martienz DO     Primary care physician: No primary care provider on file. Thank you for the consult. Chief Complaints :  Chief Complaint   Patient presents with    Chest Pain        History of present illness:Clark is a 64 y. o.year old occasion male with a past medical history of hyperlipidemia. Patient presented to the emergency department by squad due to chest pain, shortness of breath, nausea, and cold sweats. Patient stated that he began to experience some chest pain at 130/200 in the morning that woke him up and he rated it as a 6 out of 10. The pain was fairly constant and centralized. The pain did not radiate anywhere. The pain was worsening with activity and was feeling better with rest.    On the emergency department the patient began to receive a nitro drip which did help alleviate the pain. His initial troponin was negative but follow-up troponin was elevated at 0.022. EKG revealed some T wave inversion in V1 and V2. Since his time here in the ED his chest pain has decreased to a 2 out of 10. His shortness of breath has significantly improved but he still has some minor shortness of breath. Is resting fairly comfortably during examination    Patient is a very active individual who exercises daily and watches what he eats. He has no previous cardiac history. No significant family history for early CAD. He does not smoke and endorses only occasional marijuana use. Denied any illicit substance use      Past medical history:    has a past medical history of BPH (benign prostatic hyperplasia) and HLD (hyperlipidemia). Past surgical history:   has a past surgical history that includes knee surgery. Social History:   reports that he has never smoked. He has never used smokeless tobacco. He reports previous alcohol use of about 3.0 standard drinks of alcohol per week. He reports that he does not use drugs.   Family history:   no family history of CAD, STROKE of DM at early age    No Known Allergies    nitroGLYCERIN 50 mg in dextrose 5% 250 mL infusion, Continuous  tamsulosin (FLOMAX) capsule 0.4 mg, Daily  sodium chloride flush 0.9 % injection 5-40 mL, 2 times per day  sodium chloride flush 0.9 % injection 5-40 mL, PRN  0.9 % sodium chloride infusion, PRN  ondansetron (ZOFRAN-ODT) disintegrating tablet 4 mg, Q8H PRN   Or  ondansetron (ZOFRAN) injection 4 mg, Q6H PRN  acetaminophen (TYLENOL) tablet 650 mg, Q6H PRN   Or  acetaminophen (TYLENOL) suppository 650 mg, Q6H PRN  polyethylene glycol (GLYCOLAX) packet 17 g, Daily PRN  [START ON 1/19/2022] aspirin chewable tablet 81 mg, Daily  atorvastatin (LIPITOR) tablet 80 mg, Nightly  morphine injection 1 mg, Q4H PRN  enoxaparin (LOVENOX) injection 40 mg, Daily  technetium sestamibi (CARDIOLITE) injection 10 millicurie, ONCE PRN  technetium sestamibi (CARDIOLITE) injection 30 millicurie, ONCE PRN      Current Facility-Administered Medications   Medication Dose Route Frequency Provider Last Rate Last Admin    nitroGLYCERIN 50 mg in dextrose 5% 250 mL infusion  5-200 mcg/min IntraVENous Continuous Bran Deem, DO   Stopped at 01/18/22 0629    tamsulosin (FLOMAX) capsule 0.4 mg  0.4 mg Oral Daily Bran Deem, DO        sodium chloride flush 0.9 % injection 5-40 mL  5-40 mL IntraVENous 2 times per day Bran Deem, DO   10 mL at 01/18/22 0903    sodium chloride flush 0.9 % injection 5-40 mL  5-40 mL IntraVENous PRN Thuy Chávez Ani, DO        0.9 % sodium chloride infusion  25 mL IntraVENous PRN Aleida Montoya DO        ondansetron (ZOFRAN-ODT) disintegrating tablet 4 mg  4 mg Oral Q8H PRN Aleida Montoya DO   4 mg at 01/18/22 2486    Or    ondansetron (ZOFRAN) injection 4 mg  4 mg IntraVENous Q6H PRN Aleida Montoya DO        acetaminophen (TYLENOL) tablet 650 mg  650 mg Oral Q6H PRN Aleida Montoya DO        Or    acetaminophen (TYLENOL) suppository 650 mg  650 mg Rectal Q6H PRN Aleida Montoya, DO        polyethylene glycol (GLYCOLAX) packet 17 g  17 g Oral Daily PRN Aleida Montoya DO        [START ON 1/19/2022] aspirin chewable tablet 81 mg  81 mg Oral Daily Aleida Montoya DO        atorvastatin (LIPITOR) tablet 80 mg  80 mg Oral Nightly Aleida Montoya DO        morphine injection 1 mg  1 mg IntraVENous Q4H PRN Aleida Montoya DO        enoxaparin (LOVENOX) injection 40 mg  40 mg SubCUTAneous Daily Aleida Montoya DO        technetium sestamibi (CARDIOLITE) injection 10 millicurie  10 millicurie IntraVENous ONCE PRN Rosa Chapman MD        technetium sestamibi (CARDIOLITE) injection 30 millicurie  30 millicurie IntraVENous ONCE PRN Rosa Chapman MD         Current Outpatient Medications   Medication Sig Dispense Refill    Multiple Vitamin (MULTIVITAMIN ADULT PO) Take 1 tablet by mouth daily      Turmeric (QC TUMERIC COMPLEX PO) Take 1 capsule by mouth daily      tamsulosin (FLOMAX) 0.4 MG capsule TAKE ONE CAPSULE BY MOUTH 1/2 HOUR FOLLOWING THE SAME MEAL EACH DAY  3       Review of Systems   Constitutional: Positive for diaphoresis and fatigue. Negative for fever and unexpected weight change. HENT: Negative. Eyes: Negative for visual disturbance. Respiratory: Positive for shortness of breath. Negative for cough and chest tightness. Cardiovascular: Positive for chest pain. Negative for palpitations and leg swelling. Gastrointestinal: Positive for nausea. Negative for abdominal pain, diarrhea and vomiting. Endocrine: Negative for cold intolerance and heat intolerance. Musculoskeletal: Negative. Skin: Negative for pallor and rash. Neurological: Negative for dizziness, syncope, light-headedness and headaches. Hematological: Does not bruise/bleed easily. Psychiatric/Behavioral: Negative for dysphoric mood. The patient is not nervous/anxious. Physical Examination:    Vitals:    01/18/22 0630 01/18/22 0700 01/18/22 0745 01/18/22 0800   BP: 104/69 101/66 94/69 104/77   Pulse: (!) 46 54 52 62   Resp:       Temp:       TempSrc:       SpO2:       Weight:       Height:           Physical Exam  Constitutional:       General: He is not in acute distress. Appearance: He is not diaphoretic. HENT:      Head: Normocephalic and atraumatic. Right Ear: External ear normal.      Left Ear: External ear normal.      Nose: Nose normal.      Mouth/Throat:      Mouth: Mucous membranes are moist.   Eyes:      Extraocular Movements: Extraocular movements intact. Comments: Pupils equal and round   Neck:      Vascular: No carotid bruit. Cardiovascular:      Rate and Rhythm: Normal rate and regular rhythm. Pulses: Normal pulses. Heart sounds: S1 normal and S2 normal. No murmur heard. No friction rub. No gallop. Pulmonary:      Effort: Pulmonary effort is normal. No respiratory distress. Breath sounds: Normal breath sounds. No rales. Comments: No pleuritic chest pain  Chest:      Chest wall: No tenderness. Abdominal:      Palpations: Abdomen is soft. Tenderness: There is no abdominal tenderness. Musculoskeletal:      Right lower leg: No edema. Left lower leg: No edema. Skin:     General: Skin is warm and dry. Capillary Refill: Capillary refill takes less than 2 seconds. Findings: No rash. Comments: Skin turgor brisk   Neurological:      Mental Status: He is alert and oriented to person, place, and time. Mental status is at baseline.    Psychiatric: Mood and Affect: Mood normal.         Behavior: Behavior is cooperative. Thought Content: Thought content normal.         Judgment: Judgment normal.            Medical decision making and Data review:    Lab Review   Recent Labs     22   WBC 7.9   HGB 14.9   HCT 44.4         Recent Labs     22      K 3.7      CO2 25   BUN 27*   CREATININE 1.1     Recent Labs     22   AST 29   ALT 23   BILITOT 0.2   ALKPHOS 68     Recent Labs     22  0635   TROPONINT <0.010 0.022*       No results for input(s): PROBNP in the last 72 hours. No results found for: INR, PROTIME    EK2022. Reviewed by me   Sinus bradycardia. T wave inversions noted in V1 and V2. No previous EKG to compare    ECHO: 2022   Left ventricular systolic function is normal.   Ejection fraction is visually estimated at 50-55%. Mild mitral regurgitation. No evidence of any pericardial effusion. Stress test: 2022  Concerning for ischemic changes in the inferior and lateral wall    Chest Xray:  Echocardiogram complete 2D with doppler with color    Result Date: 2022  Transthoracic Echocardiography Report (TTE)  Demographics   Patient Name       Yashira Ayers  Date of Study       2022                     N   Date of Birth      1960         Gender              Male   Age                64 year(s)         Race                   Patient Number     [id number]         Room Number         [address] [facility]   Visit Number       [id number]   Corporate ID       [id number]   Accession Number   [id number]         Kayleen Mckinney RDMS   Ordering Physician Mariusz Bower MD  Interpreting        Rebeca Post                                        Physician           Alvino ZAMORA  Procedure Type of Study   TTE procedure:ECHOCARDIOGRAM COMPLETE 2D W DOPPLER W COLOR.   Procedure Date Date: 2022 Start: 07:26 AM Study Location: Portable Technical Quality: Adequate visualization Indications:Chest pain. Patient Status: Routine Height: 66 inches Weight: 160 pounds BSA: 1.82 m2 BMI: 25.82 kg/m2 HR: 55 bpm BP: 101/66 mmHg  Conclusions   Summary  Left ventricular systolic function is normal.  Ejection fraction is visually estimated at 50-55%. Mild mitral regurgitation. No evidence of any pericardial effusion. Signature   ------------------------------------------------------------------  Electronically signed by Juan Dang MD  (Interpreting physician) on 01/18/2022 at 08:08 AM  ------------------------------------------------------------------   Findings   Left Ventricle  Left ventricular systolic function is normal.  Ejection fraction is visually estimated at 50-55%. Left ventricle size is normal.  No regional wall motion abnormalites. Normal diastolic function. Left Atrium  Essentially normal left atrium. Right Atrium  Essentially normal right atrium. Right Ventricle  Essentially normal right ventricle. Aortic Valve  Structurally normal aortic valve. Mitral Valve  Mild mitral regurgitation. Tricuspid Valve  Trace tricuspid regurgitation is present. Pulmonic Valve  The pulmonic valve was not well visualized. Pericardial Effusion  No evidence of any pericardial effusion. Pleural Effusion  No evidence of pleural effusion. Miscellaneous  The aorta is within normal limits.   M-Mode/2D Measurements & Calculations   LV Diastolic Dimension:  LV Systolic Dimension:  LA Dimension: 3 cmAO Root  4.97 cm                  3.46 cm                 Dimension: 3.7 cmLA Area:  LV FS:30.4 %             LV Volume Diastolic: 68 01.4 cm2  LV PW Diastolic: 0.70 cm ml  LV PW Systolic: 0.34 cm  LV Volume Systolic: 29  Septum Diastolic: 1.14   ml  cm                       LV EDV/LV EDV Index: 68 RV Diastolic Dimension:  Septum Systolic: 1.56 cm CQ/88 C0WQ ESV/LV ESV   2.62 cm  CO: 3.59 l/min           Index: 29 ml/16 m2  CI: 1.97 l/m*m2          EF Calculated (A4C):    LA/Aorta: 0.81                           57.4 %  LV Area Diastolic: 02.4  EF Calculated (2D):     LA volume/Index: 51 ml  cm2                      57.7 %                  /48D4  LV Area Systolic: 86.2  cm2                      LV Length: 6.98 cm                            LVOT: 2 cm  Doppler Measurements & Calculations   MV Peak E-Wave: 118     AV Peak Velocity: 117 cm/s   LVOT Peak Velocity: 103  cm/s                    AV Peak Gradient: 5.48 mmHg  cm/s  MV Peak A-Wave: 85.4    AV Mean Velocity: 76.6 cm/s  LVOT Mean Velocity:  cm/s                    AV Mean Gradient: 3 mmHg     67.8 cm/s  MV E/A Ratio: 1.38      AV VTI: 26.4 cm              LVOT Peak Gradient: 4  MV Peak Gradient: 5.57  AV Area (Continuity):2.47    mmHgLVOT Mean Gradient:  mmHg                    cm2                          2 mmHg   MV P1/2t: 80 msec       LVOT VTI: 20.8 cm  MVA by PHT:2.75 cm2   MV E' Septal Velocity:  8.77 cm/s  MV E' Lateral Velocity:  11.2 cm/s  MV E/E' septal: 13.45  MV E/E' lateral: 10.54      XR CHEST PORTABLE    Result Date: 1/18/2022  EXAMINATION: ONE XRAY VIEW OF THE CHEST 1/18/2022 3:20 am COMPARISON: None. HISTORY: ORDERING SYSTEM PROVIDED HISTORY: chest pain TECHNOLOGIST PROVIDED HISTORY: Reason for exam:->chest pain Reason for Exam: chest pain Additional signs and symptoms: chest pain, sob FINDINGS: No lines or tubes. Normal cardiomediastinal silhouette. An opacity is seen adjacent to the right heart border. The left lung is clear. No pulmonary edema. No pneumothorax. No acute osseous abnormality. 1. Opacity adjacent to the right heart border which may represent infection or atelectasis. All labs, medications and tests reviewed by myself including data  from outside source , patient and available family . Continue all other medications of all above medical condition listed as is.      Impression:  Principal Problem:    Unstable angina ([facility] [address]  Resolved Problems: * No resolved hospital problems. *      Assessment: 64 y. o.year old with   1. Unstable angina    Plan and Recommendations:  1. Unstable angina  -Chest pain concerning for unstable angina.  -Initial troponin negative. Follow-up troponin elevated at 0.0 22  -EKG reviewed. T wave inversions noted in V1 and V2  -Patient was receiving nitro drip in the emergency department which did help alleviate his pain.  -Echo ordered. Demonstrated mild mitral regurgitation but no real significant abnormalities  -NM stress concerning for inferior and lateral wall ischemia  -N.p.o.  -Started on aspirin and statin  -Patient received left heart catheterization with possible angioplasty today. Procedure was explained and consent was obtained. Thank you  much for consult and giving us the opportunity in contributing in the care of this patient. Please feel free to call me for any questions. Mert Guerrero PA-C, 1/18/2022 10:08 AM       I have seen ,spoken to  and examined this patient personally, independently of the VINCENT. I have reviewed the hospital care given to date and reviewed all pertinent labs and imaging. The plan was developed mutually at the time of the visit with the patient,  VINCENT  and myself. I have spoken with patient, nursing staff and provided written and verbal instructions . The above note has been reviewed and I agree with the assessment, diagnosis, and treatment plan with changes made by me as follows     CARDIOLOGY ATTENDING ADDENDUM    HPI:  I have reviewed the above HPI  And agree with above   Lulu Renee is a 64 y. o.year old who and presents with had concerns including Chest Pain.   Chief Complaint   Patient presents with    Chest Pain     Interval history:  CP    Physical Exam:  General:  Awake, alert, NAD  Head:normal  Eye:normal  Neck:  No JVD   Chest:  Clear to auscultation, respiration easy  Cardiovascular:  RRR S1S2  Abdomen:   nontender  Extremities:  NO edema  Pulses; palpable  Neuro: grossly normal      MEDICAL DECISION MAKING;    I agree with the above plan, which was planned by myself and discussed with  VINCENT  Cardiac care optimized after review of available data   MILDLY ELEVATED TROP  ABN EKG CARDIOLITE ABN   [facility] TODAY  JENARO, BB, Soledad Tatum MD [facility]

## 2022-01-18 NOTE — PLAN OF CARE
Problem: Activity:  Goal: Ability to return to normal activity level will improve  Description: Ability to return to normal activity level will improve  Outcome: Met This Shift     Problem:  Bowel/Gastric:  Goal: Gastrointestinal status for postoperative course will improve  Description: Gastrointestinal status for postoperative course will improve  Outcome: Met This Shift     Problem: Health Behavior:  Goal: Identification of resources available to assist in meeting health care needs will improve  Description: Identification of resources available to assist in meeting health care needs will improve  Outcome: Met This Shift     Problem: Physical Regulation:  Goal: Postoperative complications will be avoided or minimized  Description: Postoperative complications will be avoided or minimized  Outcome: Met This Shift     Problem: Respiratory:  Goal: Ability to achieve and maintain a regular respiratory rate will improve  Description: Ability to achieve and maintain a regular respiratory rate will improve  Outcome: Met This Shift     Problem: Safety:  Goal: Ability to remain free from injury will improve  Description: Ability to remain free from injury will improve  Outcome: Met This Shift     Problem: Sensory:  Goal: General experience of comfort will improve  Description: General experience of comfort will improve  Outcome: Met This Shift     Problem: Skin Integrity:  Goal: Demonstration of wound healing without infection will improve  Description: Demonstration of wound healing without infection will improve  Outcome: Met This Shift

## 2022-01-18 NOTE — FLOWSHEET NOTE
Bedside report given to [address] [place] [address] and site check done.  Pt right radial site free of bleeding/hematoma upon arrival to [facility]

## 2022-01-18 NOTE — Clinical Note
Patient Class: Observation [104]   REQUIRED: Diagnosis: Unstable angina [facility]) [200953]   Estimated Length of Stay: Estimated stay of less than 2 midnights   Admitting Provider: Keren Ruiz [[id number]]   Preferred Department: nitro drip   Telemetry/Cardiac Monitoring Required?: Yes

## 2022-01-18 NOTE — ED NOTES
Medication History  [facility]    Patient Name: Sweta Valdez 1960     Medication history has been completed by: Richi Wayne CPhT    Source(s) of information: patient and insurance claims     Primary Care Physician: No primary care provider on file. Pharmacy: [facility]    Allergies as of 01/18/2022    (No Known Allergies)        Prior to Admission medications    Medication Sig Start Date End Date Taking? Authorizing Provider   Multiple Vitamin (MULTIVITAMIN ADULT PO) Take 1 tablet by mouth daily   Yes Historical Provider, MD   Turmeric (QC TUMERIC COMPLEX PO) Take 1 capsule by mouth daily   Yes Historical Provider, MD   tamsulosin (FLOMAX) 0.4 MG capsule TAKE ONE CAPSULE BY MOUTH 1/2 HOUR FOLLOWING THE SAME MEAL EACH DAY 6/14/18  Yes Historical Provider, MD     Medications added or changed (ex. new medication, dosage change, interval change, formulation change): MVI daily (added)  Tumeric daily (added)    Medications removed from list (include reason, ex. noncompliance, medication cost, therapy complete etc.):   Ceftin therapy complete   mg no longer taking    Comments:  Medication list reviewed with patient and insurance claims verified.     To my knowledge the above medication history is accurate as of 1/18/2022 8:00 AM.   Richi Wayne CPhT   1/18/2022 8:00 AM

## 2022-01-19 ENCOUNTER — APPOINTMENT (OUTPATIENT)
Dept: CARDIAC CATH/INVASIVE PROCEDURES | Age: 62
DRG: 282 | End: 2022-01-19
Payer: COMMERCIAL

## 2022-01-19 LAB
ANION GAP SERPL CALCULATED.3IONS-SCNC: 7 MMOL/L (ref 4–16)
BUN BLDV-MCNC: 17 MG/DL (ref 6–23)
CALCIUM SERPL-MCNC: 8.5 MG/DL (ref 8.3–10.6)
CHLORIDE BLD-SCNC: 109 MMOL/L (ref 99–110)
CO2: 27 MMOL/L (ref 21–32)
CREAT SERPL-MCNC: 0.9 MG/DL (ref 0.9–1.3)
EKG ATRIAL RATE: 53 BPM
EKG DIAGNOSIS: NORMAL
EKG P AXIS: 27 DEGREES
EKG P-R INTERVAL: 152 MS
EKG Q-T INTERVAL: 440 MS
EKG QRS DURATION: 88 MS
EKG QTC CALCULATION (BAZETT): 412 MS
EKG R AXIS: 34 DEGREES
EKG T AXIS: 39 DEGREES
EKG VENTRICULAR RATE: 53 BPM
GFR AFRICAN AMERICAN: >60 ML/MIN/1.73M2
GFR NON-AFRICAN AMERICAN: >60 ML/MIN/1.73M2
GLUCOSE BLD-MCNC: 95 MG/DL (ref 70–99)
HCT VFR BLD CALC: 43.1 % (ref 42–52)
HEMOGLOBIN: 14 GM/DL (ref 13.5–18)
MCH RBC QN AUTO: 29.5 PG (ref 27–31)
MCHC RBC AUTO-ENTMCNC: 32.5 % (ref 32–36)
MCV RBC AUTO: 90.7 FL (ref 78–100)
PDW BLD-RTO: 13 % (ref 11.7–14.9)
PLATELET # BLD: 162 K/CU MM (ref 140–440)
PMV BLD AUTO: 12.6 FL (ref 7.5–11.1)
POTASSIUM SERPL-SCNC: 4.3 MMOL/L (ref 3.5–5.1)
RBC # BLD: 4.75 M/CU MM (ref 4.6–6.2)
SODIUM BLD-SCNC: 143 MMOL/L (ref 135–145)
TROPONIN T: 1.57 NG/ML
WBC # BLD: 6.6 K/CU MM (ref 4–10.5)

## 2022-01-19 PROCEDURE — 85027 COMPLETE CBC AUTOMATED: CPT

## 2022-01-19 PROCEDURE — 2580000003 HC RX 258: Performed by: INTERNAL MEDICINE

## 2022-01-19 PROCEDURE — 99232 SBSQ HOSP IP/OBS MODERATE 35: CPT | Performed by: INTERNAL MEDICINE

## 2022-01-19 PROCEDURE — 36415 COLL VENOUS BLD VENIPUNCTURE: CPT

## 2022-01-19 PROCEDURE — 93017 CV STRESS TEST TRACING ONLY: CPT

## 2022-01-19 PROCEDURE — 80048 BASIC METABOLIC PNL TOTAL CA: CPT

## 2022-01-19 PROCEDURE — 84484 ASSAY OF TROPONIN QUANT: CPT

## 2022-01-19 PROCEDURE — 93010 ELECTROCARDIOGRAM REPORT: CPT | Performed by: INTERNAL MEDICINE

## 2022-01-19 PROCEDURE — 93005 ELECTROCARDIOGRAM TRACING: CPT | Performed by: INTERNAL MEDICINE

## 2022-01-19 PROCEDURE — APPSS45 APP SPLIT SHARED TIME 31-45 MINUTES: Performed by: NURSE PRACTITIONER

## 2022-01-19 PROCEDURE — 6370000000 HC RX 637 (ALT 250 FOR IP): Performed by: INTERNAL MEDICINE

## 2022-01-19 PROCEDURE — 6370000000 HC RX 637 (ALT 250 FOR IP): Performed by: NURSE PRACTITIONER

## 2022-01-19 PROCEDURE — 2140000000 HC CCU INTERMEDIATE R&B

## 2022-01-19 PROCEDURE — 94761 N-INVAS EAR/PLS OXIMETRY MLT: CPT

## 2022-01-19 RX ORDER — CLOPIDOGREL BISULFATE 75 MG/1
75 TABLET ORAL DAILY
Status: DISCONTINUED | OUTPATIENT
Start: 2022-01-19 | End: 2022-01-20 | Stop reason: HOSPADM

## 2022-01-19 RX ADMIN — RIVAROXABAN 2.5 MG: 2.5 TABLET, FILM COATED ORAL at 20:02

## 2022-01-19 RX ADMIN — RIVAROXABAN 2.5 MG: 2.5 TABLET, FILM COATED ORAL at 12:38

## 2022-01-19 RX ADMIN — CLOPIDOGREL BISULFATE 75 MG: 75 TABLET ORAL at 09:14

## 2022-01-19 RX ADMIN — TAMSULOSIN HYDROCHLORIDE 0.4 MG: 0.4 CAPSULE ORAL at 09:14

## 2022-01-19 RX ADMIN — METOPROLOL 12.5 MG: 25 TABLET ORAL at 09:13

## 2022-01-19 RX ADMIN — ATORVASTATIN CALCIUM 80 MG: 80 TABLET, FILM COATED ORAL at 20:01

## 2022-01-19 RX ADMIN — ASPIRIN 81 MG CHEWABLE TABLET 81 MG: 81 TABLET CHEWABLE at 09:13

## 2022-01-19 RX ADMIN — SODIUM CHLORIDE: 9 INJECTION, SOLUTION INTRAVENOUS at 01:07

## 2022-01-19 RX ADMIN — SODIUM CHLORIDE, PRESERVATIVE FREE 10 ML: 5 INJECTION INTRAVENOUS at 20:02

## 2022-01-19 ASSESSMENT — PAIN SCALES - GENERAL
PAINLEVEL_OUTOF10: 0
PAINLEVEL_OUTOF10: 0

## 2022-01-19 NOTE — CARE COORDINATION
.CM has reviewed pt's chart for needs. CM screening shows that pt has insurance and is independent PTA. No PCP listed. PCP list added to d/c instructions. If any d/c needs arise please contact [facility].   TE

## 2022-01-19 NOTE — PROGRESS NOTES
Cardiology Progress Note     Today's Plan decrease BB    Admit Date:  1/18/2022    Consult reason/ Seen today for: chest pain    Subjective and  Overnight Events:  States he is feeling well. He denies chest pain or shortness of breath. He has walked around nursing unit without complaints    Telemetry SB SR  Right radial cath site free of hematoma or ecchymosis  Assessment / Plan / Recommendation:     1. Chest pain  Chest pain concerning for unstable angina. Initial troponin negative however had slight bump in second troponin 0.22. EKG with T wave inversion in V1 and 2';  Stress Cardiolite completed showing small defect mild severity which is reversible involving the lateral wall : taken for left heart catheterization noted to have aneurysmal arteries, severe disease of the posterolateral branch of the RCA which fills from collaterals :  50% mid LAD stenosis. Repeat troponin this morning ruled in positive 1.570-he denies chest pain or shortness of breath. Taken for exercise treadmill stress test which showed no ischemia or EKG changes: He complained of no chest pain or cardiac symptoms. We will continue to monitor overnight and if stable plan for discharge in the am.  Aggressive risk factor modification: Continue with aspirin and plavix : Metoprolol with holding parameters and high intensity statin with atorvastatin 80 mg daily. He has aneurysmal arteries and recommend anticoagulation with low-dose Xarelto 2.5 mg bid    2. Bradycardia   consistent heart rate in the low 50s however does have appropriate chronotropic response with activity: With activity heart rate up to 80'. Decrease metoprolol to 12.5 twice daily    3. Hypotension  Blood pressure noted to be low to soft- 86//75: Asymptomatic-given IV fluid. Continue to monitor       History of Presenting Illness:    Chief complain on admission : 58 y. o.year old who is admitted for  Chief Complaint   Patient presents with    Chest Pain        Past medical history:    has a past medical history of BPH (benign prostatic hyperplasia) and HLD (hyperlipidemia). Past surgical history:   has a past surgical history that includes knee surgery. Social History:   reports that he has never smoked. He has never used smokeless tobacco. He reports previous alcohol use of about 3.0 standard drinks of alcohol per week. He reports that he does not use drugs. Family history:  family history includes Clotting Disorder in his father; Dementia in his mother; Prostate Cancer in his father. No Known Allergies    Review of Systems:   All 14 systems were reviewed and are negative  Except for the positive findings which are documented     /75   Pulse 53   Temp 98 °F (36.7 °C) (Oral)   Resp 19   Ht 5' 6\" (1.676 m)   Wt 160 lb (72.6 kg)   SpO2 95%   BMI 25.82 kg/m²       Intake/Output Summary (Last 24 hours) at 1/19/2022 1422  Last data filed at 1/19/2022 0901  Gross per 24 hour   Intake 1297.87 ml   Output    Net 1297.87 ml       Physical Exam:  Physical Exam  Constitutional:       Appearance: Normal appearance. HENT:      Mouth/Throat:      Mouth: Mucous membranes are moist.   Cardiovascular:      Rate and Rhythm: Regular rhythm. Bradycardia present. Pulses: Normal pulses. Heart sounds: Normal heart sounds. Pulmonary:      Effort: Pulmonary effort is normal.      Breath sounds: Normal breath sounds. Musculoskeletal:      Right lower leg: No edema. Left lower leg: No edema. Skin:     General: Skin is warm and dry. Capillary Refill: Capillary refill takes less than 2 seconds. Neurological:      Mental Status: He is alert and oriented to person, place, and time.           Medications:    clopidogrel  75 mg Oral Daily    metoprolol tartrate  12.5 mg Oral BID    rivaroxaban  2.5 mg Oral BID    tamsulosin  0.4 mg Oral Daily    atorvastatin  80 mg Oral Nightly    sodium chloride flush  5-40 mL IntraVENous 2 times per day    aspirin  81 mg Oral Daily    tirofiban  25 mcg/kg IntraVENous Once      nitroGLYCERIN Stopped (01/18/22 0629)    sodium chloride Stopped (01/19/22 0901)    sodium chloride       ondansetron **OR** ondansetron, [DISCONTINUED] acetaminophen **OR** acetaminophen, polyethylene glycol, morphine, sodium chloride flush, sodium chloride, acetaminophen    Lab Data:  CBC:   Recent Labs     01/18/22 0311 01/19/22 0728   WBC 7.9 6.6   HGB 14.9 14.0   HCT 44.4 43.1   MCV 89.0 90.7    162     BMP:   Recent Labs     01/18/22 0311 01/19/22  0728    143   K 3.7 4.3    109   CO2 25 27   BUN 27* 17   CREATININE 1.1 0.9     PT/INR: No results for input(s): PROTIME, INR in the last 72 hours. BNP:  No results for input(s): PROBNP in the last 72 hours. TROPONIN:   Recent Labs     01/18/22 0311 01/18/22 0635 01/19/22 0728   TROPONINT <0.010 0.022* 1.570*              Impression:  Principal Problem:    Unstable angina ([facility] [address]  Resolved Problems:    * No resolved hospital problems. *       All labs, medications and tests reviewed by myself, continue all other medications of all above medical condition listed as is except for changes mentioned above. Thank you   Please call with questions. Electronically signed by ANGÉLICA Lomax CNP on 1/19/2022 at 2:22 PM  I have seen ,spoken to  and examined this patient personally, independently of the VINCENT. I have reviewed the hospital care given to date and reviewed all pertinent labs and imaging. The plan was developed mutually at the time of the visit with the patient,  VINCENT  and myself. I have spoken with patient, nursing staff and provided written and verbal instructions . The above note has been reviewed and I agree with the assessment, diagnosis, and treatment plan with changes made by me as follows     CARDIOLOGY ATTENDING ADDENDUM    HPI:  I have reviewed the above HPI  And agree with above   Elidia Sanchez is a 58 y. o.year old who and presents with had concerns including Chest Pain.   Chief Complaint   Patient presents with    Chest Pain     Interval history:  No cp    Physical Exam:  General:  Awake, alert, NAD  Head:normal  Eye:normal  Neck:  No JVD   Chest:  Clear to auscultation, respiration easy  Cardiovascular:  RRR S1S2  Abdomen:   nontender  Extremities:  no edema  Pulses; palpable  Neuro: grossly normal      MEDICAL DECISION MAKING;    I agree with the above plan, which was planned by myself and discussed with  VINCENT  Cardiac care optimized after review of available data   ETT low levelwas OK  Trop elevated  Has no CP  Ry Walls MD [facility]

## 2022-01-19 NOTE — PROGRESS NOTES
1/19/2022 0901  Gross per 24 hour   Intake 1297.87 ml   Output    Net 1297.87 ml      Vitals:   Vitals:    01/19/22 0406   BP: (!) 86/44   Pulse:    Resp:    Temp:    SpO2: 95%     Physical Exam:   GEN Awake male, sitting upright in bed in no apparent distress. Appears given age. EYES Pupils are equally round. No scleral erythema, discharge, or conjunctivitis. HENT Mucous membranes are moist. Oral pharynx without exudates, no evidence of thrush. NECK Supple, no apparent thyromegaly or masses. RESP Clear to auscultation, no wheezes, rales or rhonchi. Symmetric chest movement while on room air. CARDIO/VASC S1/S2 auscultated. Regular rate without appreciable murmurs, rubs, or gallops. No JVD or carotid bruits. Peripheral pulses equal bilaterally and palpable. No peripheral edema. GI Abdomen is soft without significant tenderness, masses, or guarding. Bowel sounds are normoactive. Rectal exam deferred.  No costovertebral angle tenderness. Damian catheter is not present. HEME/LYMPH No palpable cervical lymphadenopathy and no hepatosplenomegaly. No petechiae or ecchymoses. MSK No gross joint deformities. SKIN Normal coloration, warm, dry. Small puncture wound to the right wrist with a tegaderm over top, dry and intact  NEURO Cranial nerves appear grossly intact, normal speech, no lateralizing weakness. PSYCH Awake, alert, oriented x 4. Affect appropriate.     Medications:   Medications:    clopidogrel  75 mg Oral Daily    metoprolol tartrate  12.5 mg Oral BID    rivaroxaban  2.5 mg Oral BID    tamsulosin  0.4 mg Oral Daily    atorvastatin  80 mg Oral Nightly    sodium chloride flush  5-40 mL IntraVENous 2 times per day    aspirin  81 mg Oral Daily    tirofiban  25 mcg/kg IntraVENous Once      Infusions:    nitroGLYCERIN Stopped (01/18/22 0629)    sodium chloride Stopped (01/19/22 0901)    sodium chloride      tirofiban Stopped (01/19/22 0900)     PRN Meds: ondansetron, 4 mg, Q8H PRN Or  ondansetron, 4 mg, Q6H PRN  acetaminophen, 650 mg, Q6H PRN  polyethylene glycol, 17 g, Daily PRN  morphine, 1 mg, Q4H PRN  sodium chloride flush, 5-40 mL, PRN  sodium chloride, 25 mL, PRN  acetaminophen, 650 mg, Q4H PRN          Patient is still admitted because ongoing cardiac work up. The anticipated discharge is in greater than 24 hours.      Electronically signed by ANGÉLICA Cuenca CNP on 1/19/2022 at 11:05 AM

## 2022-01-20 VITALS
HEIGHT: 66 IN | OXYGEN SATURATION: 97 % | TEMPERATURE: 98.1 F | RESPIRATION RATE: 15 BRPM | SYSTOLIC BLOOD PRESSURE: 105 MMHG | BODY MASS INDEX: 25.71 KG/M2 | WEIGHT: 160 LBS | DIASTOLIC BLOOD PRESSURE: 66 MMHG | HEART RATE: 52 BPM

## 2022-01-20 LAB
ALBUMIN SERPL-MCNC: 3.7 GM/DL (ref 3.4–5)
ALP BLD-CCNC: 81 IU/L (ref 40–129)
ALT SERPL-CCNC: 27 U/L (ref 10–40)
ANION GAP SERPL CALCULATED.3IONS-SCNC: 8 MMOL/L (ref 4–16)
AST SERPL-CCNC: 44 IU/L (ref 15–37)
BASOPHILS ABSOLUTE: 0 K/CU MM
BASOPHILS RELATIVE PERCENT: 0.7 % (ref 0–1)
BILIRUB SERPL-MCNC: 0.4 MG/DL (ref 0–1)
BUN BLDV-MCNC: 16 MG/DL (ref 6–23)
CALCIUM SERPL-MCNC: 9.1 MG/DL (ref 8.3–10.6)
CHLORIDE BLD-SCNC: 104 MMOL/L (ref 99–110)
CO2: 27 MMOL/L (ref 21–32)
CREAT SERPL-MCNC: 1 MG/DL (ref 0.9–1.3)
DIFFERENTIAL TYPE: ABNORMAL
EKG ATRIAL RATE: 60 BPM
EKG DIAGNOSIS: NORMAL
EKG P AXIS: 44 DEGREES
EKG P-R INTERVAL: 146 MS
EKG Q-T INTERVAL: 418 MS
EKG QRS DURATION: 78 MS
EKG QTC CALCULATION (BAZETT): 418 MS
EKG R AXIS: 8 DEGREES
EKG T AXIS: -16 DEGREES
EKG VENTRICULAR RATE: 60 BPM
EOSINOPHILS ABSOLUTE: 0.2 K/CU MM
EOSINOPHILS RELATIVE PERCENT: 2.6 % (ref 0–3)
GFR AFRICAN AMERICAN: >60 ML/MIN/1.73M2
GFR NON-AFRICAN AMERICAN: >60 ML/MIN/1.73M2
GLUCOSE BLD-MCNC: 68 MG/DL (ref 70–99)
HCT VFR BLD CALC: 46.6 % (ref 42–52)
HEMOGLOBIN: 15.5 GM/DL (ref 13.5–18)
IMMATURE NEUTROPHIL %: 0.7 % (ref 0–0.43)
LYMPHOCYTES ABSOLUTE: 1.1 K/CU MM
LYMPHOCYTES RELATIVE PERCENT: 19.3 % (ref 24–44)
MAGNESIUM: 1.8 MG/DL (ref 1.8–2.4)
MCH RBC QN AUTO: 29.8 PG (ref 27–31)
MCHC RBC AUTO-ENTMCNC: 33.3 % (ref 32–36)
MCV RBC AUTO: 89.6 FL (ref 78–100)
MONOCYTES ABSOLUTE: 0.6 K/CU MM
MONOCYTES RELATIVE PERCENT: 9.5 % (ref 0–4)
NUCLEATED RBC %: 0 %
PDW BLD-RTO: 12.8 % (ref 11.7–14.9)
PHOSPHORUS: 2.7 MG/DL (ref 2.5–4.9)
PLATELET # BLD: 158 K/CU MM (ref 140–440)
PMV BLD AUTO: 11.4 FL (ref 7.5–11.1)
POTASSIUM SERPL-SCNC: 4.1 MMOL/L (ref 3.5–5.1)
RBC # BLD: 5.2 M/CU MM (ref 4.6–6.2)
SEGMENTED NEUTROPHILS ABSOLUTE COUNT: 3.9 K/CU MM
SEGMENTED NEUTROPHILS RELATIVE PERCENT: 67.2 % (ref 36–66)
SODIUM BLD-SCNC: 139 MMOL/L (ref 135–145)
TOTAL IMMATURE NEUTOROPHIL: 0.04 K/CU MM
TOTAL NUCLEATED RBC: 0 K/CU MM
TOTAL PROTEIN: 5.8 GM/DL (ref 6.4–8.2)
TROPONIN T: 1.31 NG/ML
WBC # BLD: 5.8 K/CU MM (ref 4–10.5)

## 2022-01-20 PROCEDURE — 84100 ASSAY OF PHOSPHORUS: CPT

## 2022-01-20 PROCEDURE — 84484 ASSAY OF TROPONIN QUANT: CPT

## 2022-01-20 PROCEDURE — 85025 COMPLETE CBC W/AUTO DIFF WBC: CPT

## 2022-01-20 PROCEDURE — 2580000003 HC RX 258: Performed by: INTERNAL MEDICINE

## 2022-01-20 PROCEDURE — 36415 COLL VENOUS BLD VENIPUNCTURE: CPT

## 2022-01-20 PROCEDURE — 6360000002 HC RX W HCPCS: Performed by: FAMILY MEDICINE

## 2022-01-20 PROCEDURE — 6370000000 HC RX 637 (ALT 250 FOR IP): Performed by: NURSE PRACTITIONER

## 2022-01-20 PROCEDURE — 83735 ASSAY OF MAGNESIUM: CPT

## 2022-01-20 PROCEDURE — 6370000000 HC RX 637 (ALT 250 FOR IP): Performed by: INTERNAL MEDICINE

## 2022-01-20 PROCEDURE — 93010 ELECTROCARDIOGRAM REPORT: CPT | Performed by: INTERNAL MEDICINE

## 2022-01-20 PROCEDURE — 93005 ELECTROCARDIOGRAM TRACING: CPT | Performed by: FAMILY MEDICINE

## 2022-01-20 PROCEDURE — 80053 COMPREHEN METABOLIC PANEL: CPT

## 2022-01-20 PROCEDURE — 99232 SBSQ HOSP IP/OBS MODERATE 35: CPT | Performed by: INTERNAL MEDICINE

## 2022-01-20 PROCEDURE — 94761 N-INVAS EAR/PLS OXIMETRY MLT: CPT

## 2022-01-20 RX ORDER — CLOPIDOGREL BISULFATE 75 MG/1
75 TABLET ORAL DAILY
Qty: 30 TABLET | Refills: 3 | Status: SHIPPED | OUTPATIENT
Start: 2022-01-21 | End: 2022-01-25 | Stop reason: SDUPTHER

## 2022-01-20 RX ORDER — MAGNESIUM SULFATE IN WATER 40 MG/ML
2000 INJECTION, SOLUTION INTRAVENOUS ONCE
Status: COMPLETED | OUTPATIENT
Start: 2022-01-20 | End: 2022-01-20

## 2022-01-20 RX ORDER — ASPIRIN 81 MG/1
81 TABLET, CHEWABLE ORAL DAILY
Qty: 30 TABLET | Refills: 3 | Status: SHIPPED | OUTPATIENT
Start: 2022-01-21 | End: 2022-01-25 | Stop reason: SDUPTHER

## 2022-01-20 RX ORDER — ATORVASTATIN CALCIUM 80 MG/1
80 TABLET, FILM COATED ORAL NIGHTLY
Qty: 30 TABLET | Refills: 3 | Status: SHIPPED | OUTPATIENT
Start: 2022-01-20 | End: 2022-01-25 | Stop reason: SDUPTHER

## 2022-01-20 RX ADMIN — CLOPIDOGREL BISULFATE 75 MG: 75 TABLET ORAL at 08:44

## 2022-01-20 RX ADMIN — METOPROLOL 12.5 MG: 25 TABLET ORAL at 08:44

## 2022-01-20 RX ADMIN — SODIUM CHLORIDE, PRESERVATIVE FREE 10 ML: 5 INJECTION INTRAVENOUS at 08:47

## 2022-01-20 RX ADMIN — MAGNESIUM SULFATE HEPTAHYDRATE 2000 MG: 40 INJECTION, SOLUTION INTRAVENOUS at 11:44

## 2022-01-20 RX ADMIN — ASPIRIN 81 MG CHEWABLE TABLET 81 MG: 81 TABLET CHEWABLE at 08:44

## 2022-01-20 RX ADMIN — RIVAROXABAN 2.5 MG: 2.5 TABLET, FILM COATED ORAL at 08:51

## 2022-01-20 RX ADMIN — TAMSULOSIN HYDROCHLORIDE 0.4 MG: 0.4 CAPSULE ORAL at 08:44

## 2022-01-20 ASSESSMENT — PAIN SCALES - GENERAL
PAINLEVEL_OUTOF10: 0
PAINLEVEL_OUTOF10: 0

## 2022-01-20 NOTE — PROGRESS NOTES
Hospitalist Progress Note      Name:  Terrell Coburn /Age/Sex: 1960  (58 y.o. male)   MRN & CSN:  [id number] & [id number] Admission Date/Time: 2022  2:56 AM   Location:  [address] [place] [address] PCP: No primary care provider on file. Hospital Day: 3    Assessment and Plan:   Terrell Coburn is a 58 y.o.  male  who presents with Unstable angina ([facility] [address]    1. Chest pain        NSTEMI  1. Troponin () 0.010--->0.022----->1.570----->1.310  2. EKG shows sinus bradycardia  3. Echo () shows an EF of 50-55% with mild mitral regurgitation  4. Stress test () shows a small sized defect of mild severity which is reversible involving lateral wall of mycardium. abnormal lexiscan nuclear scintigraphic study suggestive of abnormal myocardial perfusion. Mild degree of lateral wall ischemia noted involving a small sized area of left ventricular myocardium  5. LHC () shows severe PL-RCA disease, 50% MID LAD, anuerysmal arteries   6. Repeat LHC on   7. Aggrestat and Heparin drip----.transition to oral on   8. Follow with Dr Art Gunn in 2 weeks   9. Mag () 1.8~replacing prior to DC      2. Hyperlipidemia        1. Continue lipitor     Diet ADULT DIET; Regular; Low Fat/Low Chol/High Fiber/ABHAY   DVT Prophylaxis [] Lovenox, []  Heparin, [] SCDs, [] Ambulation   GI Prophylaxis [] PPI,  [] H2 Blocker,  [] Carafate,  [x] Diet/Tube Feeds   Code Status Full Code   Disposition Patient requires continued admission due to ongoing cardiac work up         History of Present Illness:     Chief Complaint: Unstable angina ([facility]  Terrell Coburn is a 58 y.o.  male  who presents with chest pain in his chest upon waking up at 0300. Pt states that he woke up with the pain and then had a cold sweat. 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 Pt denies being a smoker, denies any alcohol or any drug use. Pt denies any chest pain or shortness of breath. Pt states that he has been up ambulating around the room and hallway without any difficulty. Pt has been updated on plan of care and is understandable to the plan. Ten point ROS reviewed negative, unless as noted above    Objective:     No intake or output data in the 24 hours ending 01/20/22 1201   Vitals:   Vitals:    01/20/22 1135   BP: 93/70   Pulse: 56   Resp: 17   Temp: 98 °F (36.7 °C)   SpO2: 97%     Physical Exam:   GEN Awake male, sitting upright in bed in no apparent distress. Appears given age. EYES Pupils are equally round. No scleral erythema, discharge, or conjunctivitis. HENT Mucous membranes are moist. Oral pharynx without exudates, no evidence of thrush. NECK Supple, no apparent thyromegaly or masses. RESP Clear to auscultation, no wheezes, rales or rhonchi. Symmetric chest movement while on room air. CARDIO/VASC S1/S2 auscultated. Regular rate without appreciable murmurs, rubs, or gallops. No JVD or carotid bruits. Peripheral pulses equal bilaterally and palpable. No peripheral edema. GI Abdomen is soft without significant tenderness, masses, or guarding. Bowel sounds are normoactive. Rectal exam deferred.  No costovertebral angle tenderness. Damian catheter is not present. HEME/LYMPH No palpable cervical lymphadenopathy and no hepatosplenomegaly. No petechiae or ecchymoses. MSK No gross joint deformities. SKIN Normal coloration, warm, dry. Small puncture wound to the right wrist with a tegaderm over top, dry and intact  NEURO Cranial nerves appear grossly intact, normal speech, no lateralizing weakness. PSYCH Awake, alert, oriented x 4. Affect appropriate.     Medications:   Medications:    magnesium sulfate  2,000 mg IntraVENous Once    clopidogrel  75 mg Oral Daily    metoprolol tartrate  12.5 mg Oral BID    rivaroxaban  2.5 mg Oral BID    tamsulosin  0.4 mg Oral Daily    atorvastatin  80 mg Oral Nightly    sodium chloride flush  5-40 mL IntraVENous 2 times per day    aspirin  81 mg Oral Daily      Infusions:    nitroGLYCERIN Stopped (01/18/22 0629)    sodium chloride       PRN Meds: ondansetron, 4 mg, Q8H PRN   Or  ondansetron, 4 mg, Q6H PRN  acetaminophen, 650 mg, Q6H PRN  polyethylene glycol, 17 g, Daily PRN  morphine, 1 mg, Q4H PRN  sodium chloride flush, 5-40 mL, PRN  sodium chloride, 25 mL, PRN  acetaminophen, 650 mg, Q4H PRN          Patient is still admitted because ongoing cardiac work up. The anticipated discharge is in less than 24 hours.      Electronically signed by ANGÉLICA Paz CNP on 1/20/2022 at 12:01 PM

## 2022-01-20 NOTE — PROGRESS NOTES
Physician Progress Note      PATIENT:               Alexa Hernandez  CSN #:                  [id number]  :                       1960  ADMIT DATE:       2022 2:56 AM  DISCH DATE:  RESPONDING  PROVIDER #:        Jil Ruano          QUERY TEXT:    Hospitalists,    Pt admitted with chest pain and has unstable angina documented. If possible,   please document in progress notes and discharge summary if you are evaluating   and/or treating any of the following as the suspected cause of the chest pain:    The medical record reflects the following:  Risk Factors: hyperlipidemia  Clinical Indicators: chest pain and unstable angina documented, per IM   documentation: \"1. Chest pain 1. Troponin () 0.010--->0.022----->1.570  2. EKG shows sinus bradycardia 3. Echo () shows an EF of 50-55% with mild   mitral regurgitation 4. Stress test () shows a small sized defect of   mild severity which is reversible involving lateral wall of mycardium. abnormal lexiscan nuclear scintigraphic study suggestive of abnormal   myocardial perfusion. Mild degree of lateral wall ischemia noted involving a   small sized area of left ventricular myocardium  5. LHC () shows severe PL-RCA disease, 50% MID LAD, anuerysmal arteries,   occluded RCA  Treatment: labs, imaging, Cardiology consult, LHC, ECHO, stress test,   Aggrestat and Heparin drip, plavix, ntg    Thank you,  Royal Todd RN CDS  [phone]  Options provided:  -- Chest pain due to CAD with unstable angina  -- Chest pain due to NSTEMI  -- Other - I will add my own diagnosis  -- Disagree - Not applicable / Not valid  -- Disagree - Clinically unable to determine / Unknown  -- Refer to Clinical Documentation Reviewer    PROVIDER RESPONSE TEXT:    This patient has a NSTEMI.     Query created by: Delmis De La O on 2022 10:16 AM      Electronically signed by:  Jil Ruano 2022 11:59 AM

## 2022-01-20 NOTE — PLAN OF CARE
Problem: Activity:  Goal: Ability to return to normal activity level will improve  Description: Ability to return to normal activity level will improve  1/20/2022 1501 by Alexsander Fuentes RN  Outcome: Completed  1/20/2022 1315 by Pancho Khalil RN  Outcome: Met This Shift     Problem:  Bowel/Gastric:  Goal: Gastrointestinal status for postoperative course will improve  Description: Gastrointestinal status for postoperative course will improve  Outcome: Completed     Problem: Health Behavior:  Goal: Identification of resources available to assist in meeting health care needs will improve  Description: Identification of resources available to assist in meeting health care needs will improve  1/20/2022 1501 by Alexsander Fuentes RN  Outcome: Completed  1/20/2022 1315 by Pancho Khalil RN  Outcome: Met This Shift     Problem: Physical Regulation:  Goal: Postoperative complications will be avoided or minimized  Description: Postoperative complications will be avoided or minimized  1/20/2022 1501 by Alexsander Fuentes RN  Outcome: Completed  1/20/2022 1315 by Pancho Khalil RN  Outcome: Met This Shift     Problem: Respiratory:  Goal: Ability to achieve and maintain a regular respiratory rate will improve  Description: Ability to achieve and maintain a regular respiratory rate will improve  Outcome: Completed     Problem: Safety:  Goal: Ability to remain free from injury will improve  Description: Ability to remain free from injury will improve  1/20/2022 1501 by Alexsander Fuentes RN  Outcome: Completed  1/20/2022 1315 by Pancho Khalil RN  Outcome: Met This Shift     Problem: Sensory:  Goal: General experience of comfort will improve  Description: General experience of comfort will improve  Outcome: Completed     Problem: Skin Integrity:  Goal: Demonstration of wound healing without infection will improve  Description: Demonstration of wound healing without infection will improve  Outcome: Completed

## 2022-01-20 NOTE — PLAN OF CARE
Problem:  Activity:  Goal: Ability to return to normal activity level will improve  Description: Ability to return to normal activity level will improve  Outcome: Met This Shift     Problem: Health Behavior:  Goal: Identification of resources available to assist in meeting health care needs will improve  Description: Identification of resources available to assist in meeting health care needs will improve  Outcome: Met This Shift     Problem: Physical Regulation:  Goal: Postoperative complications will be avoided or minimized  Description: Postoperative complications will be avoided or minimized  Outcome: Met This Shift     Problem: Safety:  Goal: Ability to remain free from injury will improve  Description: Ability to remain free from injury will improve  Outcome: Met This Shift

## 2022-01-20 NOTE — PROGRESS NOTES
CARDIOLOGY  NOTE        Name:  Shane Zavala /Age/Sex: 1960  (58 y.o. male)   MRN & CSN:  [id number] & [id number] Admission Date/Time: 2022  2:56 AM   [facility] PCP: No primary care provider on file. Hospital Day: 3        PLAN FROM CARDIOLOGY FOR TODAY:   AMBULATE, DC      - cardiology consult is for:  NSTEMI    -  Interval history:  HAD STRESS    · ASSESSMENT/ PLAN:  1.     CAD; DAPT, Low dose xarelto, has occluded RCA with diana , treat medically. Had low level ETT yesterday was OK  2. Risk factor modification          Subjective: Todays complain: Patient  No cp    HPI:  Ernestina Harada is a 58 y. o.year old who and presents with had concerns including Chest Pain. Chief Complaint   Patient presents with    Chest Pain           Objective: Temperature:  Current - Temp: 98.2 °F (36.8 °C); Max - Temp  Av.2 °F (36.8 °C)  Min: 98 °F (36.7 °C)  Max: 98.8 °F (37.1 °C)    Respiratory Rate : Resp  Av.2  Min: 13  Max: 31    Pulse Range: Pulse  Av.4  Min: 53  Max: 88    Blood Presuure Range:  Systolic (96MBT), ZZH:178 , Min:97 , FSZ:821   ; Diastolic (16ZIF), JLY:15, Min:53, Max:95      Pulse ox Range: SpO2  Av.8 %  Min: 95 %  Max: 97 %    24hr I & O:      Intake/Output Summary (Last 24 hours) at 2022 0614  Last data filed at 2022 0901  Gross per 24 hour   Intake 1297.87 ml   Output    Net 1297.87 ml         /73   Pulse 53   Temp 98.2 °F (36.8 °C) (Oral)   Resp 17   Ht 5' 6\" (1.676 m)   Wt 160 lb (72.6 kg)   SpO2 95%   BMI 25.82 kg/m²           Review of Systems:    No cp    TELEMETRY: Sinus    has a past medical history of BPH (benign prostatic hyperplasia) and HLD (hyperlipidemia). has a past surgical history that includes knee surgery.   Physical Exam:  General:  Awake, alert, NAD  Head:normal  Eye: Pupils equal and round  Neck:  No JVD, no carotid bruit noted   Chest:  Clear to auscultation, no signs of respiratory distress  Cardiovascular:  Normal rate and rhythm. S1 and S2 noted. No murmurs rubs or gallops  Abdomen:   nontender  Extremities:  no edema  Pulses; palpable  Neuro: grossly normal    Medications:    clopidogrel  75 mg Oral Daily    metoprolol tartrate  12.5 mg Oral BID    rivaroxaban  2.5 mg Oral BID    tamsulosin  0.4 mg Oral Daily    atorvastatin  80 mg Oral Nightly    sodium chloride flush  5-40 mL IntraVENous 2 times per day    aspirin  81 mg Oral Daily    tirofiban  25 mcg/kg IntraVENous Once      nitroGLYCERIN Stopped (01/18/22 0629)    sodium chloride       ondansetron **OR** ondansetron, [DISCONTINUED] acetaminophen **OR** acetaminophen, polyethylene glycol, morphine, sodium chloride flush, sodium chloride, acetaminophen    Lab Data:  CBC:   Recent Labs     01/18/22 0311 01/19/22  0728   WBC 7.9 6.6   HGB 14.9 14.0   HCT 44.4 43.1   MCV 89.0 90.7    162     BMP:   Recent Labs     01/18/22 0311 01/19/22  0728    143   K 3.7 4.3    109   CO2 25 27   BUN 27* 17   CREATININE 1.1 0.9     LIVER PROFILE:   Recent Labs     01/18/22 0311   AST 29   ALT 23   LIPASE 31   BILITOT 0.2   ALKPHOS 68     PT/INR: No results for input(s): PROTIME, INR in the last 72 hours. APTT: No results for input(s): APTT in the last 72 hours. BNP:  No results for input(s): BNP in the last 72 hours. TROPONIN: No results for input(s): TROPONINI in the last 72 hours.    Recent Labs     01/18/22 0311 01/18/22  0635 01/19/22  0728   TROPONINT <0.010 0.022* 1.570*        Labs, consult, tests reviewed                    Andria Lee MD, PA-C 1/20/2022 6:14 AM

## 2022-01-20 NOTE — DISCHARGE SUMMARY
Discharge Summary    Name:  Antonina Ordaz /Age/Sex: 1960  (58 y.o. male)   MRN & CSN:  [id number] & [id number] Admission Date/Time: 2022  2:56 AM   Attending:  No att. providers found Discharging Physician: Deedee Ray, [address] Course:   Antonina Ordaz is a 58 y.o.  male  who presents with Unstable angina ([address]    1. Chest pain        NSTEMI  1. Troponin () 0.010--->0.022----->1.570----->1.310  2. EKG shows sinus bradycardia  3. Echo () shows an EF of 50-55% with mild mitral regurgitation  4. Stress test () shows a small sized defect of mild severity which is reversible involving lateral wall of mycardium. abnormal lexiscan nuclear scintigraphic study suggestive of abnormal myocardial perfusion. Mild degree of lateral wall ischemia noted involving a small sized area of left ventricular myocardium  5. LHC () shows severe PL-RCA disease, 50% MID LAD, anuerysmal arteries   6. Repeat LHC on   7. Aggrestat and Heparin drip----.transition to oral on   8. Follow with Dr Ke Meredith in 2 weeks   9. Mag () 1.8~replacing prior to DC  10. Follow up in 1 week with Dr Monica Wood both blood thinners plus aspirin, lipitor and metoprolol        2. Hyperlipidemia        1. Continue lipitor     The patient expressed appropriate understanding of and agreement with the discharge recommendations, medications, and plan.      Consults this admission:  IP CONSULT TO HOSPITALIST  IP CONSULT TO CARDIOLOGY    Discharge Instruction:   Follow up appointments: Dr Ke Meredith  Primary care physician:  within 1 weeks    Diet:  cardiac diet   Activity: activity as tolerated  Disposition: Discharged to:   [x]Home, []HHC, []SNF, []Acute Rehab, []Hospice   Condition on discharge: Stable    Discharge Medications:        Medication List      START taking these medications    aspirin 81 MG chewable tablet  Take 1 tablet by mouth daily  Start taking on: 2022 atorvastatin 80 MG tablet  Commonly known as: LIPITOR  Take 1 tablet by mouth nightly     clopidogrel 75 MG tablet  Commonly known as: PLAVIX  Take 1 tablet by mouth daily  Start taking on: January 21, 2022     metoprolol tartrate 25 MG tablet  Commonly known as: LOPRESSOR  Take 0.5 tablets by mouth 2 times daily     rivaroxaban 2.5 MG Tabs tablet  Commonly known as: XARELTO  Take 1 tablet by mouth 2 times daily        CONTINUE taking these medications    MULTIVITAMIN ADULT PO     tamsulosin 0.4 MG capsule  Commonly known as: FLOMAX        STOP taking these medications    QC TUMERIC COMPLEX PO           Where to Get Your Medications      These medications were sent to [facility]/pharmacy #[id number]- [place], [place] - [address] - P [phone] - F [phone]  [address], [facility] [address]    Phone: [phone]   · aspirin 81 MG chewable tablet  · atorvastatin 80 MG tablet  · clopidogrel 75 MG tablet  · metoprolol tartrate 25 MG tablet  · rivaroxaban 2.5 MG Tabs tablet         Objective Findings at Discharge:   BP 93/70   Pulse 56   Temp 98 °F (36.7 °C) (Oral)   Resp 17   Ht 5' 6\" (1.676 m)   Wt 160 lb (72.6 kg)   SpO2 97%   BMI 25.82 kg/m²            PHYSICAL EXAM   GEN Awake male, sitting upright in bed in no apparent distress. Appears given age. EYES Pupils are equally round. No scleral erythema, discharge, or conjunctivitis. HENT Mucous membranes are moist. Oral pharynx without exudates, no evidence of thrush. NECK Supple, no apparent thyromegaly or masses. RESP Clear to auscultation, no wheezes, rales or rhonchi. Symmetric chest movement while on room air. CARDIO/VASC S1/S2 auscultated. Regular rate without appreciable murmurs, rubs, or gallops. No JVD or carotid bruits. Peripheral pulses equal bilaterally and palpable. No peripheral edema. GI Abdomen is soft without significant tenderness, masses, or guarding. Bowel sounds are normoactive. Rectal exam deferred.     No costovertebral angle tenderness. Normal appearing external genitalia. Damian catheter is not present. HEME/LYMPH No palpable cervical lymphadenopathy and no hepatosplenomegaly. No petechiae or ecchymoses. MSK No gross joint deformities. SKIN Normal coloration, warm, dry. NEURO Cranial nerves appear grossly intact, normal speech, no lateralizing weakness. PSYCH Awake, alert, oriented x 4. Affect appropriate.     BMP/CBC  Recent Labs     01/18/22  0311 01/19/22  0728 01/20/22  0857    143 139   K 3.7 4.3 4.1    109 104   CO2 25 27 27   BUN 27* 17 16   CREATININE 1.1 0.9 1.0   WBC 7.9 6.6 5.8   HCT 44.4 43.1 46.6    162 158       IMAGING:  Results reviewed     Discharge Time of 35> minutes    Electronically signed by ANGÉLICA Brown CNP on 1/20/2022 at 2:53 PM

## 2022-01-21 ENCOUNTER — CARE COORDINATION (OUTPATIENT)
Dept: CASE MANAGEMENT | Age: 62
End: 2022-01-21

## 2022-01-21 NOTE — CARE COORDINATION
Nich[facility] Transitions Initial Follow Up Call    Call within 2 business days of discharge: Yes    Patient: Jean Mo Patient : 1960   MRN: [id number]  Reason for Admission: Chest pain  Discharge Date: 22 RARS: Readmission Risk Score: 5.5 ( )      Last Discharge [facility]       Complaint Diagnosis Description Type Department Provider    22 Chest Pain Acute chest pain ED to Hosp-Admission (Discharged) (ADMITTED) Marilia Cruz, DO; Chrystal Lux... Spoke with: Lynda Sexton, patient    Facility: [facility] patient for initial Care Transition follow up. Elidia Sanchez states that he is doing good. States he had been doing good since Tuesday/Wednesday. Reports having a cold which started yesterday. Advised to follow up with PCP or Pharmacist to verify which OTC cold medicine he should take. He verbalized understanding. He denies having any c/o chest pain/discomfort, pressure, tightness, shortness of breath, numbness/tingling. He plans to contact cardiology and new provider for one week follow up appointments. Declines needing assistance from CTN. Reviewed medication list.  He confirmed he picked up all of the new medications and has started them. He is aware that the [address] has been discontinued. He is aware of when to contact his provider with any new or worsening symptoms. No questions or needs at this time. Transitions of Care Initial Call    Was this an external facility discharge? No Discharge Facility: [facility]     Challenges to be reviewed by the provider   Additional needs identified to be addressed with provider: No  none             Method of communication with provider : none      Advance Care Planning:   Does patient have an Advance Directive: Not on file    Was this a readmission?  No  Patient stated reason for admission: chest pain  Patients top risk factors for readmission: medical condition-chest pain, unstable angina    Care Transition Nurse (CTN) contacted the patient by telephone to perform post hospital discharge assessment. Verified name and  with patient as identifiers. Provided introduction to self, and explanation of the CTN role. CTN reviewed discharge instructions, medical action plan and red flags with patient who verbalized understanding. Patient given an opportunity to ask questions and does not have any further questions or concerns at this time. Were discharge instructions available to patient? Yes. Reviewed appropriate site of care based on symptoms and resources available to patient including: PCP and Specialist. The patient agrees to contact the PCP office for questions related to their healthcare. Medication reconciliation was performed with patient, who verbalizes understanding of administration of home medications. Advised obtaining a 90-day supply of all daily and as-needed medications. Reviewed and educated patient on any new and changed medications related to discharge diagnosis. Was patient discharged with a pulse oximeter? No Discussed and confirmed pulse oximeter discharge instructions and when to notify provider or seek emergency care. CTN provided contact information. Plan for follow-up call in 5-7 days based on severity of symptoms and risk factors.   Plan for next call: symptom management-any new or worsening symptoms (chest pain)  follow up appointment-ensure follow up appointment has been scheduled with cardiology and PCP    Care Transitions 24 Hour Call    Do you have any ongoing symptoms?: No  Do you have a copy of your discharge instructions?: Yes  Do you have all of your prescriptions and are they filled?: Yes  Have you been contacted by a [address]?: No  Have you scheduled your follow up appointment?: No  Were you discharged with any Home Care or Post Acute Services: No  Do you feel like you have everything you need to keep you well at home?: Yes  Care Transitions Interventions         Follow Up  No future appointments.     Terrence Bojorquez RN

## 2022-01-25 ENCOUNTER — OFFICE VISIT (OUTPATIENT)
Dept: CARDIOLOGY CLINIC | Age: 62
End: 2022-01-25
Payer: COMMERCIAL

## 2022-01-25 VITALS
HEART RATE: 60 BPM | DIASTOLIC BLOOD PRESSURE: 62 MMHG | HEIGHT: 66 IN | WEIGHT: 164 LBS | SYSTOLIC BLOOD PRESSURE: 104 MMHG | BODY MASS INDEX: 26.36 KG/M2

## 2022-01-25 DIAGNOSIS — R07.2 PRECORDIAL CHEST PAIN: Primary | ICD-10-CM

## 2022-01-25 PROCEDURE — 1111F DSCHRG MED/CURRENT MED MERGE: CPT | Performed by: INTERNAL MEDICINE

## 2022-01-25 PROCEDURE — 99214 OFFICE O/P EST MOD 30 MIN: CPT | Performed by: INTERNAL MEDICINE

## 2022-01-25 PROCEDURE — 93000 ELECTROCARDIOGRAM COMPLETE: CPT | Performed by: INTERNAL MEDICINE

## 2022-01-25 RX ORDER — ATORVASTATIN CALCIUM 80 MG/1
80 TABLET, FILM COATED ORAL NIGHTLY
Qty: 90 TABLET | Refills: 3 | Status: SHIPPED | OUTPATIENT
Start: 2022-01-25

## 2022-01-25 RX ORDER — ASPIRIN 81 MG/1
81 TABLET, CHEWABLE ORAL DAILY
Qty: 90 TABLET | Refills: 3 | Status: SHIPPED | OUTPATIENT
Start: 2022-01-25

## 2022-01-25 RX ORDER — CLOPIDOGREL BISULFATE 75 MG/1
75 TABLET ORAL DAILY
Qty: 90 TABLET | Refills: 3 | Status: SHIPPED | OUTPATIENT
Start: 2022-01-25

## 2022-01-25 NOTE — PATIENT INSTRUCTIONS
Please be informed that if you contact our office outside of normal business hours the physician on call cannot help with any scheduling or rescheduling issues, procedure instruction questions or any type of medication issue. We advise you for any urgent/emergency that you go to the nearest emergency room! PLEASE CALL OUR OFFICE DURING NORMAL BUSINESS HOURS    Monday - Friday   8 am to 5 pm    [place] [address]: [phone]    [place]:  [phone]  **It is YOUR responsibilty to bring medication bottles and/or updated medication list to [address] [place] [address]. This will allow us to better serve you and all your healthcare needs**  [facility] Locations - No appointment necessary. Sites open Monday to Friday. Call your preferred location for test preparation, business hours and other information you need. [employer] accepts [employer]. [place] [facility] [address] [place], [address] [facility]  Phone: [phone] [facility] [address] [facility]  [address]. Radha Moralez, [address] [facility]  Phone: [phone]     Please hold on to these instructions the  will call you within 1-9 business days when we receive authorization from your insurance. Nuclear Stress Test    WHAT TO EXPECT:   ? You will need to confirm the test or it could be cancelled. ? This test will take approximately 2 hours: 1 hour in the AM &    1 hour in the PM. You will be given a time by the Technologist after the first part is completed to come back. ? You will be given a medication, through an IV in the hand, this will safely simulate exercise. This IV is also needed to inject the radioactive isotope unless you are able toe walk the treadmill. ? You will receive an injection in the AM & PM before the pictures. ?  Using a special camera, you will have one set of pictures of your heart taken in the AM and a set of pictures in the PM.     PREPARATION FOR TEST:  ? Eat a light breakfast such as water or juice and toast.  ? If you are DIABETIC: Eat a normal breakfast with NO CAFFEINE and take your insulin as normal.   ? AVOID ALL FOODS & DRINKS containing CAFFEINE 12 HOURS PRIOR TO THE TEST: Including coffee, Tea, Catherine and other soft drinks even those labeled  caffeine free or decaffeinated.  ? HOLD THESE MEDICATIONS Persantine & Theophylline (Theodur)  24 hours prior & bring your inhaler with you.    The physician will specify if the following Beta blockers need to be held for 24 hours prior to test: Toprol XL (metoprolol ER)

## 2022-01-25 NOTE — PROGRESS NOTES
CARDIOLOGY NOTE      1/25/2022    RE: Toñito Phippschester  (1960)                               TO:  Dr. Michelle Collado, APRN - CNP            CHIEF Jessica Garcia is a 58 y.o. male who was seen today for management of  cad                                    HPI:                   Pt has h/o coronary artery disease, hyperlipidemia, seen today for follow-up on his visit to the hospital at that time the cardiac catheterization showed RCA was occluded filling from collaterals his troponin was elevated with a low level exercise test before discharged. Pt has no chest pain or has lower back pain according to him he got some cold symptoms after leaving the hospital has no exertional chest pain or shortness of breath however    Toñito Phippschester has the following history recorded in care path:  Patient Active Problem List    Diagnosis Date Noted    Unstable angina ([facility] [address] 01/18/2022    Acute chest pain     Sensorineural hearing loss, bilateral 02/09/2017    Tinnitus 02/09/2017     Current Outpatient Medications   Medication Sig Dispense Refill    aspirin 81 MG chewable tablet Take 1 tablet by mouth daily 30 tablet 3    rivaroxaban (XARELTO) 2.5 MG TABS tablet Take 1 tablet by mouth 2 times daily 60 tablet 3    atorvastatin (LIPITOR) 80 MG tablet Take 1 tablet by mouth nightly 30 tablet 3    metoprolol tartrate (LOPRESSOR) 25 MG tablet Take 0.5 tablets by mouth 2 times daily 60 tablet 3    clopidogrel (PLAVIX) 75 MG tablet Take 1 tablet by mouth daily 30 tablet 3    Multiple Vitamin (MULTIVITAMIN ADULT PO) Take 1 tablet by mouth daily      tamsulosin (FLOMAX) 0.4 MG capsule TAKE ONE CAPSULE BY MOUTH 1/2 HOUR FOLLOWING THE SAME MEAL EACH DAY  3     No current facility-administered medications for this visit. Allergies: Patient has no known allergies.   Past Medical History:   Diagnosis Date    BPH (benign prostatic hyperplasia)     HLD (hyperlipidemia)      Past Surgical History: Procedure Laterality Date    KNEE SURGERY        As reviewed   Family History   Problem Relation Age of Onset    Dementia Mother     Prostate Cancer Father     Clotting Disorder Father      Social History     Tobacco Use    Smoking status: Never Smoker    Smokeless tobacco: Never Used   Substance Use Topics    Alcohol use: Not Currently     Alcohol/week: 3.0 standard drinks     Types: 3 Cans of beer per week        Objective:    Vitals:    01/25/22 0947   BP: 104/62   Pulse: 60   Weight: 164 lb (74.4 kg)   Height: 5' 6\" (1.676 m)     /62   Pulse 60   Ht 5' 6\" (1.676 m)   Wt 164 lb (74.4 kg)   BMI 26.47 kg/m²     No flowsheet data found. Wt Readings from Last 3 Encounters:   01/25/22 164 lb (74.4 kg)   01/18/22 160 lb (72.6 kg)     Body mass index is 26.47 kg/m². GENERAL - Alert, oriented, pleasant, in no apparent distress. EYES: No jaundice, no conjunctival pallor. SKIN: It is warm & dry. No rashes. No Echhymosis    HEENT  No clinically significant abnormalities seen. Neck - Supple. No jugular venous distention noted. No carotid bruits. Cardiovascular  Normal S1 and S2 without obvious murmur or gallop. Extremities - No cyanosis, clubbing, or significant edema. Pulmonary  No respiratory distress. No wheezes or rales. Abdomen  No masses, tenderness, or organomegaly. Musculoskeletal  No significant edema. No joint deformities. No muscle wasting. Neurologic  Cranial nerves II through XII are grossly intact. There were no gross focal neurologic abnormalities.     Lab Review   Lab Results   Component Value Date    TROPONINT 1.310 01/20/2022     BNP:  No results found for: BNP  PT/INR:  No results found for: INR  Lab Results   Component Value Date    LABA1C 5.1 01/18/2022     Lab Results   Component Value Date    WBC 5.8 01/20/2022    HCT 46.6 01/20/2022    MCV 89.6 01/20/2022     01/20/2022     Lab Results   Component Value Date    CHOL 180 01/18/2022    TRIG 87 01/18/2022    HDL 52 01/18/2022    LDLDIRECT 113 (H) 01/18/2022     Lab Results   Component Value Date    ALT 27 01/20/2022    AST 44 (H) 01/20/2022     BMP:    Lab Results   Component Value Date     01/20/2022    K 4.1 01/20/2022     01/20/2022    CO2 27 01/20/2022    BUN 16 01/20/2022    CREATININE 1.0 01/20/2022     CMP:   Lab Results   Component Value Date     01/20/2022    K 4.1 01/20/2022     01/20/2022    CO2 27 01/20/2022    BUN 16 01/20/2022    PROT 5.8 01/20/2022     TSH:  No results found for: TSH, TSHHS        Assessment & Plan:               -     CORONARY ARTERY DISEASE:  asymptomatic     All available  tests in chart reviewed. Management discussed . Testing ordered  yes, stress cardiolite       On plavix asa  lopressor  Was also placed on low dose Xarelto as he had clots in the right coronary artery  Will discontinue Xarelto  We will obtain a full level stress test in a couple of weeks and will  clear him for rehab  The Cardiolite will be obtained given that the patient had a possibly inferior wall MI to check ischemic burden there is an    -  LIPID MANAGEMENT:  Importance of lipid levels discussed with patient   and patient was given dietary advice. NCEP- ATP III guidelines reviewed with patient.     -   Changes  in medicines made: No     On lipitor                                                             Ly Beckman MD    [facility]

## 2022-01-28 ENCOUNTER — CARE COORDINATION (OUTPATIENT)
Dept: CASE MANAGEMENT | Age: 62
End: 2022-01-28

## 2022-01-28 NOTE — CARE COORDINATION
Nichol 45 Transitions Follow Up Call    2022    Patient: Mc Lemon  Patient : 1960   MRN: [id number]  Reason for Admission: Chest pain  Discharge Date: 22 RARS: Readmission Risk Score: 5.5 ( )         Spoke with: Naila Nayak, patient    Contacted patient for Care Transition follow up. Spoke very briefly with patient. Yanira Lamb states that he is doing good. Denies having any c/o chest pain/discomfort, shortness of breath, numbness, tingling. States he has a cold which has not been worsening. States his cold is \"coming to an end\". He is eating and drinking fluids w/o issues. He is aware of when to contact his provider with any new or worsening symptoms. No questions or concerns at this time. Care Transitions Follow Up Call    Needs to be reviewed by the provider   Additional needs identified to be addressed with provider: No  none             Method of communication with provider : none      Care Transition Nurse (CTN) contacted the patient by telephone to follow up after admission on 22-22. Verified name and  with patient as identifiers. Addressed changes since last contact: none  Discussed follow-up appointments. If no appointment was previously scheduled, appointment scheduling offered: Yes. Is follow up appointment scheduled within 7 days of discharge? Yes. CTN reviewed discharge instructions, medical action plan and red flags with patient and discussed any barriers to care and/or understanding of plan of care after discharge. Discussed appropriate site of care based on symptoms and resources available to patient including: PCP and Specialist. The patient agrees to contact the PCP office for questions related to their healthcare.      Patients top risk factors for readmission: medical condition-chest pain, unstable angina  Interventions to address risk factors: Education of patient/family/caregiver/guardian to support self-management-when to contact provider    CTN provided contact information for future needs. Plan for follow-up call in 10-14 days based on severity of symptoms and risk factors. Plan for next call: symptom management-any new or worsening symptoms (CP)      Care Transitions Subsequent and Final Call    Subsequent and Final Calls  Do you have any ongoing symptoms?: Yes  Patient-reported symptoms: Other  Have your medications changed?: No  Do you have any questions related to your medications?: No  Do you currently have any active services?: No  Do you have any needs or concerns that I can assist you with?: No  Care Transitions Interventions  Other Interventions:            Follow Up  Future Appointments   Date Time Provider Edmundo De   2/2/2022  8:30 AM Bong AMARO [facility]   3/2/2022  8:40 AM Dre Love MD [facility]       Julius Pathak RN

## 2022-01-31 RX ORDER — NITROGLYCERIN 0.4 MG/1
0.4 TABLET SUBLINGUAL EVERY 5 MIN PRN
Qty: 25 TABLET | Refills: 3 | Status: SHIPPED | OUTPATIENT
Start: 2022-01-31

## 2022-02-02 ENCOUNTER — PROCEDURE VISIT (OUTPATIENT)
Dept: CARDIOLOGY CLINIC | Age: 62
End: 2022-02-02
Payer: COMMERCIAL

## 2022-02-02 DIAGNOSIS — R07.2 PRECORDIAL CHEST PAIN: ICD-10-CM

## 2022-02-02 LAB
LV EF: 60 %
LVEF MODALITY: NORMAL

## 2022-02-02 PROCEDURE — 78452 HT MUSCLE IMAGE SPECT MULT: CPT | Performed by: INTERNAL MEDICINE

## 2022-02-02 PROCEDURE — 93016 CV STRESS TEST SUPVJ ONLY: CPT | Performed by: INTERNAL MEDICINE

## 2022-02-02 PROCEDURE — A9500 TC99M SESTAMIBI: HCPCS | Performed by: INTERNAL MEDICINE

## 2022-02-02 PROCEDURE — 93018 CV STRESS TEST I&R ONLY: CPT | Performed by: INTERNAL MEDICINE

## 2022-02-02 PROCEDURE — 93017 CV STRESS TEST TRACING ONLY: CPT | Performed by: INTERNAL MEDICINE

## 2022-02-08 ENCOUNTER — TELEPHONE (OUTPATIENT)
Dept: CARDIOLOGY CLINIC | Age: 62
End: 2022-02-08

## 2022-02-09 ENCOUNTER — CARE COORDINATION (OUTPATIENT)
Dept: CASE MANAGEMENT | Age: 62
End: 2022-02-09

## 2022-02-09 NOTE — CARE COORDINATION
Nichol 45 Transitions Follow Up Call    2022    Patient: Jean Mo  Patient : 1960   MRN: [id number]  Reason for Admission: Chest pain  Discharge Date: 22 RARS: Readmission Risk Score: 5.5 ( )    Attempted to contact patient for Care Transition follow up. Unable to reach patient. Left message with contact information and request for call back.       Follow Up  Future Appointments   Date Time Provider Edmundo De   3/2/2022  8:40 AM Roger Vo MD [facility]       Edie Koch RN

## 2022-02-10 DIAGNOSIS — I21.4 NON-ST ELEVATION MYOCARDIAL INFARCTION (NSTEMI) (HCC): Primary | ICD-10-CM

## 2022-02-16 ENCOUNTER — HOSPITAL ENCOUNTER (OUTPATIENT)
Dept: CARDIAC REHAB | Age: 62
Setting detail: THERAPIES SERIES
Discharge: HOME | End: 2022-02-16
Payer: COMMERCIAL

## 2022-02-16 PROCEDURE — G0423 INTENS CARDIAC REHAB NO EXER: HCPCS

## 2022-02-16 PROCEDURE — G0422 INTENS CARDIAC REHAB W/EXERC: HCPCS

## 2022-02-17 ENCOUNTER — CARE COORDINATION (OUTPATIENT)
Dept: CASE MANAGEMENT | Age: 62
End: 2022-02-17

## 2022-02-17 NOTE — CARE COORDINATION
Nich[facility] Transitions Follow Up Call    2022    Patient: Wil Zazueta  Patient : 1960   MRN: [id number]  Reason for Admission: chest pain  Discharge Date: 22 RARS: Readmission Risk Score: 5.5 ( )         Spoke with: Kelly Montalvo" Jose Davidichele, patient    Contacted patient for Care Transition follow up. Spoke very briefly with patient. Taiwo Brito states that he is doing good. Denies having any c/o chest pain/discomfort, pressure, tightness, shortness of breath, numbness/tingling. Reports he no longer has a cold. He is eating and drinking fluids w/o issues. States \"things have normalized\". Briefly discussed when to contact his provider with any new or worsening symptoms. He verbalized understanding. No questions or concerns at this time. No further outreach. Care Transitions Follow Up Call    Needs to be reviewed by the provider   Additional needs identified to be addressed with provider: No  none             Method of communication with provider : none      Care Transition Nurse (CTN) contacted the patient by telephone to follow up after admission on 22-22. Verified name and  with patient as identifiers. Addressed changes since last contact: no new or worsening symptoms  Discussed follow-up appointments. If no appointment was previously scheduled, appointment scheduling offered: Yes. Is follow up appointment scheduled within 7 days of discharge? Yes. .     Patients top risk factors for readmission: medical condition-chest pain, unstable angina  Interventions to address risk factors: Education of patient/family/caregiver/guardian to support self-management-when to contact provider with any new or worsening symptoms    CTN provided contact information for future needs. No further follow-up call indicated based on severity of symptoms and risk factors.     Care Transitions Subsequent and Final Call    Subsequent and Final Calls  Do you have any ongoing symptoms?: 9:00 AM Heber [address]   3/17/2022 11:00 AM 3 [address] [facility]   3/21/2022  9:00 AM [address] EXERCISE [address] [facility]   3/21/2022 11:00 AM 3 [address] Darling [facility]   3/22/2022  9:00 AM [facility] CARDIAC REHAB EXERCISE [facility]   3/22/2022 11:00 AM 3 [address] Darling [place]   3/24/2022  9:00 AM [address] EXERCISE [facility]   3/24/2022 11:00 AM 3 Par[address] Darling [facility]   3/28/2022  9:00 AM [address] EXERCISE [facility]   3/28/2022 11:00 AM 3 [address]   3/29/2022  9:00 AM [address] EXERCISE [facility]   3/29/2022 11:00 AM 3 Parveen Darling [facility]   3/31/2022  9:00 AM [facility] CARDIAC REHAB EXERCISE [facility]   3/31/2022 11:00 AM 3 [address] Darling [place]   4/4/2022  9:00 AM [facility] CARDIAC REHAB EXERCISE [facility]   4/4/2022 11:00 AM 3 [address] Darling [facility]   4/5/2022  9:00 AM [facility] CARDIAC REHAB EXERCISE [facility]   4/5/2022 11:00 AM 3 [address] [place]   4/7/2022  9:00 AM [facility] CARDIAC REHAB EXERCISE [facility]   4/7/2022 11:00 AM 3 Parve[address] Darling [facility]   4/11/2022  9:00 AM [facility] CARDIAC REHAB EXERCISE [facility]   4/11/2022 11:00 AM 3 [address] Dar[address] [place]   4/12/2022  9:00 AM [facility] CARDIAC REHAB EXERCISE [facility]   4/12/2022 11:00 AM 3 [address] Dar[address] [place]   4/14/2022  9:00 AM [facility] CARDIAC REHAB EXERCISE [facility]   4/14/2022 11:00 AM 3 Par[address] Darling [place]   4/18/2022  9:00 AM [facility] CARDIAC REHAB EXERCISE [address] [place] [address]   4/18/2022 11:00 AM [address] [facility] [facility] [employer]   4/19/2022  9:00 AM [address] EXERCISE [address] 1000 [facility]   4/19/2022 11:00 AM [address] [facility]   4/21/2022  9:00 AM [address] CARDIAC REHAB EXERCISE [address] CAR 1000 [facility]   4/21/2022 11:00 AM [address] [facility]   4/25/2022  9:00 AM 1200 [facility] CARDIAC REHAB EXERCISE [address] CAR 1000 [facility]   4/25/2022 11:00 AM [address]   4/26/2022  9:00 AM [address] EXERCISE [address] CAR 1000 [facility]   4/26/2022 11:00 AM [address] [facility]   4/28/2022  9:00 AM 1200 [facility] CARDIAC REHAB EXERCISE [address] CAR 1000 [facility]   4/28/2022 11:00 AM [address] [place]   5/2/2022  9:00 AM [address] CARDIAC REHAB EXERCISE 1415 [facility]   5/2/2022 11:00  [facility] [address] CAR [address]   5/3/2022  9:00 AM [address] CARDIAC REHAB EXERCISE [address]   5/3/2022 11:00 AM [address] [facility]   5/5/2022  9:00 AM [address] CARDIAC REHAB EXERCISE [address] CAR 1000 [facility]   5/5/2022 11:00 AM [address] [place]   5/9/2022  9:00 AM [address] CARDIAC REHAB EXERCISE 1415 [facility]   5/9/2022 11:00 AM [address] [place]   5/10/2022  9:00 AM [address] CARDIAC REHAB EXERCISE [address] CAR 1000 [facility]   5/10/2022 11:00  [facility] CAR [address]       Anjelica Rust, [facility]

## 2022-02-21 ENCOUNTER — HOSPITAL ENCOUNTER (OUTPATIENT)
Dept: CARDIAC REHAB | Age: 62
Setting detail: THERAPIES SERIES
Discharge: HOME | End: 2022-02-21
Payer: COMMERCIAL

## 2022-02-21 PROCEDURE — G0423 INTENS CARDIAC REHAB NO EXER: HCPCS

## 2022-02-21 PROCEDURE — G0422 INTENS CARDIAC REHAB W/EXERC: HCPCS

## 2022-02-22 ENCOUNTER — APPOINTMENT (OUTPATIENT)
Dept: CARDIAC REHAB | Age: 62
End: 2022-02-22
Payer: COMMERCIAL

## 2022-02-22 ENCOUNTER — HOSPITAL ENCOUNTER (OUTPATIENT)
Dept: CARDIAC REHAB | Age: 62
Setting detail: THERAPIES SERIES
Discharge: HOME | End: 2022-02-22
Payer: COMMERCIAL

## 2022-02-22 PROCEDURE — G0423 INTENS CARDIAC REHAB NO EXER: HCPCS

## 2022-02-22 PROCEDURE — G0422 INTENS CARDIAC REHAB W/EXERC: HCPCS

## 2022-02-24 ENCOUNTER — HOSPITAL ENCOUNTER (OUTPATIENT)
Dept: CARDIAC REHAB | Age: 62
Setting detail: THERAPIES SERIES
Discharge: HOME | End: 2022-02-24
Payer: COMMERCIAL

## 2022-02-24 ENCOUNTER — APPOINTMENT (OUTPATIENT)
Dept: CARDIAC REHAB | Age: 62
End: 2022-02-24
Payer: COMMERCIAL

## 2022-02-24 PROCEDURE — G0422 INTENS CARDIAC REHAB W/EXERC: HCPCS

## 2022-02-24 PROCEDURE — 93798 PHYS/QHP OP CAR RHAB W/ECG: CPT

## 2022-02-24 PROCEDURE — G0423 INTENS CARDIAC REHAB NO EXER: HCPCS

## 2022-02-28 ENCOUNTER — HOSPITAL ENCOUNTER (OUTPATIENT)
Dept: CARDIAC REHAB | Age: 62
Setting detail: THERAPIES SERIES
Discharge: HOME | End: 2022-02-28
Payer: COMMERCIAL

## 2022-02-28 ENCOUNTER — APPOINTMENT (OUTPATIENT)
Dept: CARDIAC REHAB | Age: 62
End: 2022-02-28
Payer: COMMERCIAL

## 2022-02-28 PROCEDURE — G0423 INTENS CARDIAC REHAB NO EXER: HCPCS

## 2022-02-28 PROCEDURE — G0422 INTENS CARDIAC REHAB W/EXERC: HCPCS

## 2022-03-01 ENCOUNTER — APPOINTMENT (OUTPATIENT)
Dept: CARDIAC REHAB | Age: 62
End: 2022-03-01
Payer: COMMERCIAL

## 2022-03-01 ENCOUNTER — HOSPITAL ENCOUNTER (OUTPATIENT)
Dept: CARDIAC REHAB | Age: 62
Setting detail: THERAPIES SERIES
Discharge: HOME | End: 2022-03-01
Payer: COMMERCIAL

## 2022-03-01 PROCEDURE — G0422 INTENS CARDIAC REHAB W/EXERC: HCPCS

## 2022-03-01 PROCEDURE — G0423 INTENS CARDIAC REHAB NO EXER: HCPCS

## 2022-03-02 ENCOUNTER — OFFICE VISIT (OUTPATIENT)
Dept: CARDIOLOGY CLINIC | Age: 62
End: 2022-03-02
Payer: COMMERCIAL

## 2022-03-02 VITALS
HEIGHT: 66 IN | BODY MASS INDEX: 26.68 KG/M2 | HEART RATE: 55 BPM | DIASTOLIC BLOOD PRESSURE: 70 MMHG | SYSTOLIC BLOOD PRESSURE: 110 MMHG | WEIGHT: 166 LBS

## 2022-03-02 DIAGNOSIS — I20.0 UNSTABLE ANGINA (HCC): Primary | ICD-10-CM

## 2022-03-02 PROCEDURE — 99214 OFFICE O/P EST MOD 30 MIN: CPT | Performed by: INTERNAL MEDICINE

## 2022-03-02 NOTE — PATIENT INSTRUCTIONS
**It is YOUR responsibilty to bring medication bottles and/or updated medication list to [address] [place] [address]. This will allow us to better serve you and all your healthcare needs**    Please be informed that if you contact our office outside of normal business hours the physician on call cannot help with any scheduling or rescheduling issues, procedure instruction questions or any type of medication issue. We advise you for any urgent/emergency that you go to the nearest emergency room!     PLEASE CALL OUR OFFICE DURING NORMAL BUSINESS HOURS    Monday - Friday   8 am to 5 pm    [place]: [phone]    [place]:  [phone]

## 2022-03-02 NOTE — PROGRESS NOTES
CARDIOLOGY NOTE      3/2/2022    RE: Joanna Ponce  (1960)                               TO:  Dr. Zach Mann, APRN - CNP            CHIEF Mallorie Swift is a 58 y.o. male who was seen today for management of  cad                    Here for follow-up on his nuclear stress test                HPI:                   Pt has h/o coronary artery disease, hyperlipidemia, seen today for follow-up on his visit to the hospital at that time the cardiac catheterization showed RCA was occluded filling from collaterals his troponin was elevated with a low level exercise test before discharged.  Pt has no chest pain or has lower back pain according to him he got some cold symptoms after leaving the hospital has no exertional chest pain or shortness of breath however  Long discussion with his  and his girlfriend we reviewed the images together given that he is asymptomatic now and the Cardiolite is normal we will continue with medical therapy    Joanna Ponce has the following history recorded in care path:  Patient Active Problem List    Diagnosis Date Noted    Unstable angina ([facility] [address] 01/18/2022    Acute chest pain     Sensorineural hearing loss, bilateral 02/09/2017    Tinnitus 02/09/2017     Current Outpatient Medications   Medication Sig Dispense Refill    nitroGLYCERIN (NITROSTAT) 0.4 MG SL tablet Place 1 tablet under the tongue every 5 minutes as needed for Chest pain 25 tablet 3    metoprolol tartrate (LOPRESSOR) 25 MG tablet Take 0.5 tablets by mouth 2 times daily 180 tablet 3    clopidogrel (PLAVIX) 75 MG tablet Take 1 tablet by mouth daily 90 tablet 3    atorvastatin (LIPITOR) 80 MG tablet Take 1 tablet by mouth nightly 90 tablet 3    aspirin 81 MG chewable tablet Take 1 tablet by mouth daily 90 tablet 3    Multiple Vitamin (MULTIVITAMIN ADULT PO) Take 1 tablet by mouth daily      tamsulosin (FLOMAX) 0.4 MG capsule TAKE ONE CAPSULE BY MOUTH 1/2 HOUR FOLLOWING THE SAME MEAL EACH DAY  3     No current facility-administered medications for this visit. Allergies: Patient has no known allergies. Past Medical History:   Diagnosis Date    BPH (benign prostatic hyperplasia)     HLD (hyperlipidemia)      Past Surgical History:   Procedure Laterality Date    KNEE SURGERY        As reviewed   Family History   Problem Relation Age of Onset    Dementia Mother     Prostate Cancer Father     Clotting Disorder Father      Social History     Tobacco Use    Smoking status: Never Smoker    Smokeless tobacco: Never Used   Substance Use Topics    Alcohol use: Not Currently     Alcohol/week: 3.0 standard drinks     Types: 3 Cans of beer per week        Objective: There were no vitals filed for this visit. There were no vitals taken for this visit. No flowsheet data found. Wt Readings from Last 3 Encounters:   01/25/22 164 lb (74.4 kg)   01/18/22 160 lb (72.6 kg)     There is no height or weight on file to calculate BMI. GENERAL - Alert, oriented, pleasant, in no apparent distress. EYES: No jaundice, no conjunctival pallor. SKIN: It is warm & dry. No rashes. No Echhymosis    HEENT  No clinically significant abnormalities seen. Neck - Supple. No jugular venous distention noted. No carotid bruits. Cardiovascular  Normal S1 and S2 without obvious murmur or gallop. Extremities - No cyanosis, clubbing, or significant edema. Pulmonary  No respiratory distress. No wheezes or rales. Abdomen  No masses, tenderness, or organomegaly. Musculoskeletal  No significant edema. No joint deformities. No muscle wasting. Neurologic  Cranial nerves II through XII are grossly intact. There were no gross focal neurologic abnormalities.     Lab Review   Lab Results   Component Value Date    TROPONINT 1.310 01/20/2022     BNP:  No results found for: BNP  PT/INR:  No results found for: INR  Lab Results   Component Value Date    LABA1C 5.1 01/18/2022     Lab Results   Component Value Date    WBC 5.8 01/20/2022    HCT 46.6 01/20/2022    MCV 89.6 01/20/2022     01/20/2022     Lab Results   Component Value Date    CHOL 180 01/18/2022    TRIG 87 01/18/2022    HDL 52 01/18/2022    LDLDIRECT 113 (H) 01/18/2022     Lab Results   Component Value Date    ALT 27 01/20/2022    AST 44 (H) 01/20/2022     BMP:    Lab Results   Component Value Date     01/20/2022    K 4.1 01/20/2022     01/20/2022    CO2 27 01/20/2022    BUN 16 01/20/2022    CREATININE 1.0 01/20/2022     CMP:   Lab Results   Component Value Date     01/20/2022    K 4.1 01/20/2022     01/20/2022    CO2 27 01/20/2022    BUN 16 01/20/2022    PROT 5.8 01/20/2022     TSH:  No results found for: TSH, TSHHS        Assessment & Plan:               -     CORONARY ARTERY DISEASE:  asymptomatic     All available  tests in chart reviewed. Management discussed . Testing ordered  yes, stress cardiolite       On plavix asa    Patient is in rehab already the Cardiolite is normal  Can hold BB  On discussion regarding risk factor modification and management of his totally occluded RCA which is filling from collaterals which appear to be sufficient given that the nuclear scan is normal and he is asymptomatic  Initially placed on anticoagulant which has been DC'd since then    Anabell Georgi; patient is bradycardic in the heart rate of 50s hence will discontinue the beta-blocker given that he is feeling weak on and off      -  LIPID MANAGEMENT:  Importance of lipid levels discussed with patient   and patient was given dietary advice. NCEP- ATP III guidelines reviewed with patient.     -   Changes  in medicines made: No     On lipitor plan to check the lipid profile in about 3 months                      -Cardiolite images are as follows                                         Kiera Garcia MD    [facility]

## 2022-03-03 ENCOUNTER — HOSPITAL ENCOUNTER (OUTPATIENT)
Dept: CARDIAC REHAB | Age: 62
Setting detail: THERAPIES SERIES
Discharge: HOME | End: 2022-03-03
Payer: COMMERCIAL

## 2022-03-03 ENCOUNTER — APPOINTMENT (OUTPATIENT)
Dept: CARDIAC REHAB | Age: 62
End: 2022-03-03
Payer: COMMERCIAL

## 2022-03-03 PROCEDURE — G0423 INTENS CARDIAC REHAB NO EXER: HCPCS

## 2022-03-03 PROCEDURE — G0422 INTENS CARDIAC REHAB W/EXERC: HCPCS

## 2022-03-07 ENCOUNTER — HOSPITAL ENCOUNTER (OUTPATIENT)
Dept: CARDIAC REHAB | Age: 62
Setting detail: THERAPIES SERIES
Discharge: HOME | End: 2022-03-07
Payer: COMMERCIAL

## 2022-03-07 ENCOUNTER — APPOINTMENT (OUTPATIENT)
Dept: CARDIAC REHAB | Age: 62
End: 2022-03-07
Payer: COMMERCIAL

## 2022-03-07 PROCEDURE — G0422 INTENS CARDIAC REHAB W/EXERC: HCPCS

## 2022-03-07 PROCEDURE — G0423 INTENS CARDIAC REHAB NO EXER: HCPCS

## 2022-03-08 ENCOUNTER — HOSPITAL ENCOUNTER (OUTPATIENT)
Dept: CARDIAC REHAB | Age: 62
Setting detail: THERAPIES SERIES
Discharge: HOME | End: 2022-03-08
Payer: COMMERCIAL

## 2022-03-08 ENCOUNTER — APPOINTMENT (OUTPATIENT)
Dept: CARDIAC REHAB | Age: 62
End: 2022-03-08
Payer: COMMERCIAL

## 2022-03-08 PROCEDURE — G0422 INTENS CARDIAC REHAB W/EXERC: HCPCS

## 2022-03-08 PROCEDURE — G0423 INTENS CARDIAC REHAB NO EXER: HCPCS

## 2022-03-10 ENCOUNTER — HOSPITAL ENCOUNTER (OUTPATIENT)
Dept: CARDIAC REHAB | Age: 62
Setting detail: THERAPIES SERIES
Discharge: HOME | End: 2022-03-10
Payer: COMMERCIAL

## 2022-03-10 ENCOUNTER — APPOINTMENT (OUTPATIENT)
Dept: CARDIAC REHAB | Age: 62
End: 2022-03-10
Payer: COMMERCIAL

## 2022-03-10 PROCEDURE — G0423 INTENS CARDIAC REHAB NO EXER: HCPCS

## 2022-03-10 PROCEDURE — G0422 INTENS CARDIAC REHAB W/EXERC: HCPCS

## 2022-03-14 ENCOUNTER — HOSPITAL ENCOUNTER (OUTPATIENT)
Dept: CARDIAC REHAB | Age: 62
Setting detail: THERAPIES SERIES
Discharge: HOME | End: 2022-03-14
Payer: COMMERCIAL

## 2022-03-14 ENCOUNTER — APPOINTMENT (OUTPATIENT)
Dept: CARDIAC REHAB | Age: 62
End: 2022-03-14
Payer: COMMERCIAL

## 2022-03-14 PROCEDURE — G0422 INTENS CARDIAC REHAB W/EXERC: HCPCS

## 2022-03-14 PROCEDURE — G0423 INTENS CARDIAC REHAB NO EXER: HCPCS

## 2022-03-15 ENCOUNTER — APPOINTMENT (OUTPATIENT)
Dept: CARDIAC REHAB | Age: 62
End: 2022-03-15
Payer: COMMERCIAL

## 2022-03-17 ENCOUNTER — HOSPITAL ENCOUNTER (OUTPATIENT)
Dept: CARDIAC REHAB | Age: 62
Setting detail: THERAPIES SERIES
Discharge: HOME | End: 2022-03-17
Payer: COMMERCIAL

## 2022-03-17 ENCOUNTER — APPOINTMENT (OUTPATIENT)
Dept: CARDIAC REHAB | Age: 62
End: 2022-03-17
Payer: COMMERCIAL

## 2022-03-17 PROCEDURE — G0422 INTENS CARDIAC REHAB W/EXERC: HCPCS

## 2022-03-17 PROCEDURE — G0423 INTENS CARDIAC REHAB NO EXER: HCPCS

## 2022-03-21 ENCOUNTER — APPOINTMENT (OUTPATIENT)
Dept: CARDIAC REHAB | Age: 62
End: 2022-03-21
Payer: COMMERCIAL

## 2022-03-21 ENCOUNTER — HOSPITAL ENCOUNTER (OUTPATIENT)
Dept: CARDIAC REHAB | Age: 62
Setting detail: THERAPIES SERIES
Discharge: HOME | End: 2022-03-21
Payer: COMMERCIAL

## 2022-03-21 PROCEDURE — G0422 INTENS CARDIAC REHAB W/EXERC: HCPCS

## 2022-03-21 PROCEDURE — G0423 INTENS CARDIAC REHAB NO EXER: HCPCS

## 2022-03-22 ENCOUNTER — HOSPITAL ENCOUNTER (OUTPATIENT)
Dept: CARDIAC REHAB | Age: 62
Setting detail: THERAPIES SERIES
Discharge: HOME | End: 2022-03-22
Payer: COMMERCIAL

## 2022-03-22 ENCOUNTER — APPOINTMENT (OUTPATIENT)
Dept: CARDIAC REHAB | Age: 62
End: 2022-03-22
Payer: COMMERCIAL

## 2022-03-22 PROCEDURE — G0422 INTENS CARDIAC REHAB W/EXERC: HCPCS

## 2022-03-22 PROCEDURE — G0423 INTENS CARDIAC REHAB NO EXER: HCPCS

## 2022-03-24 ENCOUNTER — APPOINTMENT (OUTPATIENT)
Dept: CARDIAC REHAB | Age: 62
End: 2022-03-24
Payer: COMMERCIAL

## 2022-03-24 ENCOUNTER — HOSPITAL ENCOUNTER (OUTPATIENT)
Dept: CARDIAC REHAB | Age: 62
Setting detail: THERAPIES SERIES
Discharge: HOME | End: 2022-03-24
Payer: COMMERCIAL

## 2022-03-24 PROCEDURE — G0422 INTENS CARDIAC REHAB W/EXERC: HCPCS

## 2022-03-24 PROCEDURE — G0423 INTENS CARDIAC REHAB NO EXER: HCPCS

## 2022-03-28 ENCOUNTER — HOSPITAL ENCOUNTER (OUTPATIENT)
Dept: CARDIAC REHAB | Age: 62
Setting detail: THERAPIES SERIES
Discharge: HOME | End: 2022-03-28
Payer: COMMERCIAL

## 2022-03-28 ENCOUNTER — APPOINTMENT (OUTPATIENT)
Dept: CARDIAC REHAB | Age: 62
End: 2022-03-28
Payer: COMMERCIAL

## 2022-03-28 PROCEDURE — G0422 INTENS CARDIAC REHAB W/EXERC: HCPCS

## 2022-03-28 PROCEDURE — G0423 INTENS CARDIAC REHAB NO EXER: HCPCS

## 2022-03-29 ENCOUNTER — APPOINTMENT (OUTPATIENT)
Dept: CARDIAC REHAB | Age: 62
End: 2022-03-29
Payer: COMMERCIAL

## 2022-03-29 ENCOUNTER — HOSPITAL ENCOUNTER (OUTPATIENT)
Dept: CARDIAC REHAB | Age: 62
Setting detail: THERAPIES SERIES
Discharge: HOME | End: 2022-03-29
Payer: COMMERCIAL

## 2022-03-29 PROCEDURE — G0423 INTENS CARDIAC REHAB NO EXER: HCPCS

## 2022-03-29 PROCEDURE — G0422 INTENS CARDIAC REHAB W/EXERC: HCPCS

## 2022-03-31 ENCOUNTER — HOSPITAL ENCOUNTER (OUTPATIENT)
Dept: CARDIAC REHAB | Age: 62
Setting detail: THERAPIES SERIES
Discharge: HOME | End: 2022-03-31
Payer: COMMERCIAL

## 2022-03-31 ENCOUNTER — APPOINTMENT (OUTPATIENT)
Dept: CARDIAC REHAB | Age: 62
End: 2022-03-31
Payer: COMMERCIAL

## 2022-03-31 PROCEDURE — G0423 INTENS CARDIAC REHAB NO EXER: HCPCS

## 2022-03-31 PROCEDURE — G0422 INTENS CARDIAC REHAB W/EXERC: HCPCS

## 2022-04-04 ENCOUNTER — HOSPITAL ENCOUNTER (OUTPATIENT)
Dept: CARDIAC REHAB | Age: 62
Setting detail: THERAPIES SERIES
Discharge: HOME | End: 2022-04-04
Payer: COMMERCIAL

## 2022-04-04 ENCOUNTER — APPOINTMENT (OUTPATIENT)
Dept: CARDIAC REHAB | Age: 62
End: 2022-04-04
Payer: COMMERCIAL

## 2022-04-04 PROCEDURE — G0423 INTENS CARDIAC REHAB NO EXER: HCPCS

## 2022-04-04 PROCEDURE — G0422 INTENS CARDIAC REHAB W/EXERC: HCPCS

## 2022-04-05 ENCOUNTER — HOSPITAL ENCOUNTER (OUTPATIENT)
Dept: CARDIAC REHAB | Age: 62
Setting detail: THERAPIES SERIES
Discharge: HOME | End: 2022-04-05
Payer: COMMERCIAL

## 2022-04-05 ENCOUNTER — APPOINTMENT (OUTPATIENT)
Dept: CARDIAC REHAB | Age: 62
End: 2022-04-05
Payer: COMMERCIAL

## 2022-04-05 PROCEDURE — G0423 INTENS CARDIAC REHAB NO EXER: HCPCS

## 2022-04-05 PROCEDURE — G0422 INTENS CARDIAC REHAB W/EXERC: HCPCS

## 2022-04-07 ENCOUNTER — APPOINTMENT (OUTPATIENT)
Dept: CARDIAC REHAB | Age: 62
End: 2022-04-07
Payer: COMMERCIAL

## 2022-04-07 ENCOUNTER — HOSPITAL ENCOUNTER (OUTPATIENT)
Dept: CARDIAC REHAB | Age: 62
Setting detail: THERAPIES SERIES
Discharge: HOME | End: 2022-04-07
Payer: COMMERCIAL

## 2022-04-07 PROCEDURE — G0422 INTENS CARDIAC REHAB W/EXERC: HCPCS

## 2022-04-07 PROCEDURE — G0423 INTENS CARDIAC REHAB NO EXER: HCPCS

## 2022-04-11 ENCOUNTER — HOSPITAL ENCOUNTER (OUTPATIENT)
Dept: CARDIAC REHAB | Age: 62
Setting detail: THERAPIES SERIES
Discharge: HOME | End: 2022-04-11
Payer: COMMERCIAL

## 2022-04-11 ENCOUNTER — APPOINTMENT (OUTPATIENT)
Dept: CARDIAC REHAB | Age: 62
End: 2022-04-11
Payer: COMMERCIAL

## 2022-04-11 PROCEDURE — G0423 INTENS CARDIAC REHAB NO EXER: HCPCS

## 2022-04-11 PROCEDURE — G0422 INTENS CARDIAC REHAB W/EXERC: HCPCS

## 2022-04-12 ENCOUNTER — APPOINTMENT (OUTPATIENT)
Dept: CARDIAC REHAB | Age: 62
End: 2022-04-12
Payer: COMMERCIAL

## 2022-04-12 ENCOUNTER — HOSPITAL ENCOUNTER (OUTPATIENT)
Dept: CARDIAC REHAB | Age: 62
Setting detail: THERAPIES SERIES
Discharge: HOME | End: 2022-04-12
Payer: COMMERCIAL

## 2022-04-12 PROCEDURE — G0423 INTENS CARDIAC REHAB NO EXER: HCPCS

## 2022-04-12 PROCEDURE — G0422 INTENS CARDIAC REHAB W/EXERC: HCPCS

## 2022-04-14 ENCOUNTER — HOSPITAL ENCOUNTER (OUTPATIENT)
Dept: CARDIAC REHAB | Age: 62
Setting detail: THERAPIES SERIES
Discharge: HOME | End: 2022-04-14
Payer: COMMERCIAL

## 2022-04-14 ENCOUNTER — APPOINTMENT (OUTPATIENT)
Dept: CARDIAC REHAB | Age: 62
End: 2022-04-14
Payer: COMMERCIAL

## 2022-04-14 PROCEDURE — G0422 INTENS CARDIAC REHAB W/EXERC: HCPCS

## 2022-04-14 PROCEDURE — G0423 INTENS CARDIAC REHAB NO EXER: HCPCS

## 2022-04-18 ENCOUNTER — APPOINTMENT (OUTPATIENT)
Dept: CARDIAC REHAB | Age: 62
End: 2022-04-18
Payer: COMMERCIAL

## 2022-04-18 ENCOUNTER — HOSPITAL ENCOUNTER (OUTPATIENT)
Dept: CARDIAC REHAB | Age: 62
Setting detail: THERAPIES SERIES
Discharge: HOME | End: 2022-04-18
Payer: COMMERCIAL

## 2022-04-18 PROCEDURE — G0423 INTENS CARDIAC REHAB NO EXER: HCPCS

## 2022-04-18 PROCEDURE — G0422 INTENS CARDIAC REHAB W/EXERC: HCPCS

## 2022-04-19 ENCOUNTER — HOSPITAL ENCOUNTER (OUTPATIENT)
Dept: CARDIAC REHAB | Age: 62
Setting detail: THERAPIES SERIES
Discharge: HOME | End: 2022-04-19
Payer: COMMERCIAL

## 2022-04-19 ENCOUNTER — APPOINTMENT (OUTPATIENT)
Dept: CARDIAC REHAB | Age: 62
End: 2022-04-19
Payer: COMMERCIAL

## 2022-04-19 PROCEDURE — G0422 INTENS CARDIAC REHAB W/EXERC: HCPCS

## 2022-04-19 PROCEDURE — G0423 INTENS CARDIAC REHAB NO EXER: HCPCS

## 2022-04-21 ENCOUNTER — HOSPITAL ENCOUNTER (OUTPATIENT)
Dept: CARDIAC REHAB | Age: 62
Setting detail: THERAPIES SERIES
Discharge: HOME | End: 2022-04-21
Payer: COMMERCIAL

## 2022-04-21 ENCOUNTER — APPOINTMENT (OUTPATIENT)
Dept: CARDIAC REHAB | Age: 62
End: 2022-04-21
Payer: COMMERCIAL

## 2022-04-21 PROCEDURE — G0422 INTENS CARDIAC REHAB W/EXERC: HCPCS

## 2022-04-21 PROCEDURE — G0423 INTENS CARDIAC REHAB NO EXER: HCPCS

## 2022-04-25 ENCOUNTER — APPOINTMENT (OUTPATIENT)
Dept: CARDIAC REHAB | Age: 62
End: 2022-04-25
Payer: COMMERCIAL

## 2022-04-25 ENCOUNTER — HOSPITAL ENCOUNTER (OUTPATIENT)
Dept: CARDIAC REHAB | Age: 62
Setting detail: THERAPIES SERIES
Discharge: HOME | End: 2022-04-25
Payer: COMMERCIAL

## 2022-04-25 PROCEDURE — G0422 INTENS CARDIAC REHAB W/EXERC: HCPCS

## 2022-04-25 PROCEDURE — G0423 INTENS CARDIAC REHAB NO EXER: HCPCS

## 2022-04-26 ENCOUNTER — HOSPITAL ENCOUNTER (OUTPATIENT)
Dept: CARDIAC REHAB | Age: 62
Setting detail: THERAPIES SERIES
Discharge: HOME | End: 2022-04-26
Payer: COMMERCIAL

## 2022-04-26 ENCOUNTER — APPOINTMENT (OUTPATIENT)
Dept: CARDIAC REHAB | Age: 62
End: 2022-04-26
Payer: COMMERCIAL

## 2022-04-26 PROCEDURE — G0423 INTENS CARDIAC REHAB NO EXER: HCPCS

## 2022-04-26 PROCEDURE — G0422 INTENS CARDIAC REHAB W/EXERC: HCPCS

## 2022-04-28 ENCOUNTER — APPOINTMENT (OUTPATIENT)
Dept: CARDIAC REHAB | Age: 62
End: 2022-04-28
Payer: COMMERCIAL

## 2022-04-28 ENCOUNTER — HOSPITAL ENCOUNTER (OUTPATIENT)
Dept: CARDIAC REHAB | Age: 62
Setting detail: THERAPIES SERIES
Discharge: HOME | End: 2022-04-28
Payer: COMMERCIAL

## 2022-04-28 PROCEDURE — G0423 INTENS CARDIAC REHAB NO EXER: HCPCS

## 2022-04-28 PROCEDURE — G0422 INTENS CARDIAC REHAB W/EXERC: HCPCS

## 2022-05-02 ENCOUNTER — HOSPITAL ENCOUNTER (OUTPATIENT)
Dept: CARDIAC REHAB | Age: 62
Setting detail: THERAPIES SERIES
Discharge: HOME | End: 2022-05-02
Payer: COMMERCIAL

## 2022-05-02 ENCOUNTER — APPOINTMENT (OUTPATIENT)
Dept: CARDIAC REHAB | Age: 62
End: 2022-05-02
Payer: COMMERCIAL

## 2022-05-02 PROCEDURE — G0422 INTENS CARDIAC REHAB W/EXERC: HCPCS

## 2022-05-02 PROCEDURE — G0423 INTENS CARDIAC REHAB NO EXER: HCPCS

## 2022-05-03 ENCOUNTER — HOSPITAL ENCOUNTER (OUTPATIENT)
Dept: CARDIAC REHAB | Age: 62
Setting detail: THERAPIES SERIES
Discharge: HOME | End: 2022-05-03
Payer: COMMERCIAL

## 2022-05-03 ENCOUNTER — APPOINTMENT (OUTPATIENT)
Dept: CARDIAC REHAB | Age: 62
End: 2022-05-03
Payer: COMMERCIAL

## 2022-05-03 PROCEDURE — G0423 INTENS CARDIAC REHAB NO EXER: HCPCS

## 2022-05-03 PROCEDURE — G0422 INTENS CARDIAC REHAB W/EXERC: HCPCS

## 2022-05-05 ENCOUNTER — APPOINTMENT (OUTPATIENT)
Dept: CARDIAC REHAB | Age: 62
End: 2022-05-05
Payer: COMMERCIAL

## 2022-05-05 ENCOUNTER — HOSPITAL ENCOUNTER (OUTPATIENT)
Dept: CARDIAC REHAB | Age: 62
Setting detail: THERAPIES SERIES
Discharge: HOME | End: 2022-05-05
Payer: COMMERCIAL

## 2022-05-05 PROCEDURE — G0423 INTENS CARDIAC REHAB NO EXER: HCPCS

## 2022-05-05 PROCEDURE — G0422 INTENS CARDIAC REHAB W/EXERC: HCPCS

## 2022-05-09 ENCOUNTER — HOSPITAL ENCOUNTER (OUTPATIENT)
Dept: CARDIAC REHAB | Age: 62
Setting detail: THERAPIES SERIES
Discharge: HOME | End: 2022-05-09
Payer: COMMERCIAL

## 2022-05-09 ENCOUNTER — APPOINTMENT (OUTPATIENT)
Dept: CARDIAC REHAB | Age: 62
End: 2022-05-09
Payer: COMMERCIAL

## 2022-05-09 PROCEDURE — G0423 INTENS CARDIAC REHAB NO EXER: HCPCS

## 2022-05-09 PROCEDURE — G0422 INTENS CARDIAC REHAB W/EXERC: HCPCS

## 2022-05-10 ENCOUNTER — HOSPITAL ENCOUNTER (OUTPATIENT)
Dept: CARDIAC REHAB | Age: 62
Setting detail: THERAPIES SERIES
Discharge: HOME | End: 2022-05-10
Payer: COMMERCIAL

## 2022-05-10 ENCOUNTER — APPOINTMENT (OUTPATIENT)
Dept: CARDIAC REHAB | Age: 62
End: 2022-05-10
Payer: COMMERCIAL

## 2022-05-10 PROCEDURE — G0422 INTENS CARDIAC REHAB W/EXERC: HCPCS

## 2022-05-10 PROCEDURE — G0423 INTENS CARDIAC REHAB NO EXER: HCPCS

## 2022-05-12 ENCOUNTER — HOSPITAL ENCOUNTER (OUTPATIENT)
Dept: CARDIAC REHAB | Age: 62
Setting detail: THERAPIES SERIES
Discharge: HOME | End: 2022-05-12
Payer: COMMERCIAL

## 2022-05-12 LAB
A/G RATIO: NORMAL
ALBUMIN SERPL-MCNC: 4.6 G/DL
ALP BLD-CCNC: 96 U/L
ALT SERPL-CCNC: 60 U/L
AST SERPL-CCNC: 41 U/L
AVERAGE GLUCOSE: NORMAL
BASOPHILS ABSOLUTE: 0 /ΜL
BASOPHILS RELATIVE PERCENT: 1 %
BILIRUB SERPL-MCNC: 0.6 MG/DL (ref 0.1–1.4)
BILIRUBIN DIRECT: 0.18 MG/DL
BILIRUBIN, INDIRECT: NORMAL
CHOLESTEROL, TOTAL: 118 MG/DL
CHOLESTEROL/HDL RATIO: NORMAL
EOSINOPHILS ABSOLUTE: 0.1 /ΜL
EOSINOPHILS RELATIVE PERCENT: 2 %
GLOBULIN: NORMAL
HBA1C MFR BLD: 5.4 %
HCT VFR BLD CALC: 46.5 % (ref 41–53)
HDLC SERPL-MCNC: 50 MG/DL (ref 35–70)
HEMOGLOBIN: 15.9 G/DL (ref 13.5–17.5)
LDL CHOLESTEROL CALCULATED: 52 MG/DL (ref 0–160)
LYMPHOCYTES ABSOLUTE: 1.2 /ΜL
LYMPHOCYTES RELATIVE PERCENT: 24 %
MCH RBC QN AUTO: 30.5 PG
MCHC RBC AUTO-ENTMCNC: 34.2 G/DL
MCV RBC AUTO: 89 FL
MONOCYTES ABSOLUTE: 0.3 /ΜL
MONOCYTES RELATIVE PERCENT: 6 %
NEUTROPHILS ABSOLUTE: 3.2 /ΜL
NEUTROPHILS RELATIVE PERCENT: 67 %
NONHDLC SERPL-MCNC: NORMAL MG/DL
PLATELET # BLD: 166 K/ΜL
PMV BLD AUTO: NORMAL FL
PROTEIN TOTAL: 6.4 G/DL
RBC # BLD: 5.22 10^6/ΜL
TRIGL SERPL-MCNC: 79 MG/DL
VLDLC SERPL CALC-MCNC: 16 MG/DL
WBC # BLD: 4.9 10^3/ML

## 2022-05-12 PROCEDURE — G0422 INTENS CARDIAC REHAB W/EXERC: HCPCS

## 2022-05-12 PROCEDURE — G0423 INTENS CARDIAC REHAB NO EXER: HCPCS

## 2022-05-16 ENCOUNTER — APPOINTMENT (OUTPATIENT)
Dept: CARDIAC REHAB | Age: 62
End: 2022-05-16
Payer: COMMERCIAL

## 2022-10-10 ENCOUNTER — OFFICE VISIT (OUTPATIENT)
Dept: CARDIOLOGY CLINIC | Age: 62
End: 2022-10-10
Payer: COMMERCIAL

## 2022-10-10 VITALS
WEIGHT: 163.6 LBS | HEART RATE: 62 BPM | OXYGEN SATURATION: 99 % | DIASTOLIC BLOOD PRESSURE: 76 MMHG | HEIGHT: 66 IN | SYSTOLIC BLOOD PRESSURE: 118 MMHG | BODY MASS INDEX: 26.29 KG/M2

## 2022-10-10 DIAGNOSIS — I20.0 UNSTABLE ANGINA (HCC): Primary | ICD-10-CM

## 2022-10-10 PROCEDURE — 99214 OFFICE O/P EST MOD 30 MIN: CPT | Performed by: INTERNAL MEDICINE

## 2022-10-10 NOTE — PROGRESS NOTES
CARDIOLOGY NOTE      10/10/2022    RE: Naman Hernandez  (1960)                               TO:  Dr. Aundrea Alexander, APRN - CNP            CHIEF COMPLAINT   Tyrone Main is a 58 y.o. male who was seen today for management of  cad                    Here for follow-up            HPI:                   Pt has h/o coronary artery disease, hyperlipidemia, seen today for follow-up on his visit to the hospital at that time the cardiac catheterization showed RCA was occluded filling from collaterals here for FU  Naman Hernandez has the following history recorded in care path:  Patient Active Problem List    Diagnosis Date Noted    Unstable angina ([facility] [address] 01/18/2022    Acute chest pain     Sensorineural hearing loss, bilateral 02/09/2017    Tinnitus 02/09/2017     Current Outpatient Medications   Medication Sig Dispense Refill    nitroGLYCERIN (NITROSTAT) 0.4 MG SL tablet Place 1 tablet under the tongue every 5 minutes as needed for Chest pain 25 tablet 3    clopidogrel (PLAVIX) 75 MG tablet Take 1 tablet by mouth daily 90 tablet 3    atorvastatin (LIPITOR) 80 MG tablet Take 1 tablet by mouth nightly 90 tablet 3    aspirin 81 MG chewable tablet Take 1 tablet by mouth daily 90 tablet 3    Multiple Vitamin (MULTIVITAMIN ADULT PO) Take 1 tablet by mouth daily      tamsulosin (FLOMAX) 0.4 MG capsule TAKE ONE CAPSULE BY MOUTH 1/2 HOUR FOLLOWING THE SAME MEAL EACH DAY  3     No current facility-administered medications for this visit. Allergies: Patient has no known allergies.   Past Medical History:   Diagnosis Date    BPH (benign prostatic hyperplasia)     HLD (hyperlipidemia)      Past Surgical History:   Procedure Laterality Date    KNEE SURGERY        As reviewed   Family History   Problem Relation Age of Onset    Dementia Mother     Prostate Cancer Father     Clotting Disorder Father      Social History     Tobacco Use    Smoking status: Never    Smokeless tobacco: Never   Substance Use Topics Alcohol use: Not Currently     Alcohol/week: 3.0 standard drinks     Types: 3 Cans of beer per week        Objective: There were no vitals filed for this visit. There were no vitals taken for this visit. No flowsheet data found. Wt Readings from Last 3 Encounters:   03/02/22 166 lb (75.3 kg)   01/25/22 164 lb (74.4 kg)   01/18/22 160 lb (72.6 kg)     There is no height or weight on file to calculate BMI. GENERAL - Alert, oriented, pleasant, in no apparent distress. EYES: No jaundice, no conjunctival pallor. SKIN: It is warm & dry. No rashes. No Echhymosis    HEENT - No clinically significant abnormalities seen. Neck - Supple. No jugular venous distention noted. No carotid bruits. Cardiovascular - Normal S1 and S2 without obvious murmur or gallop. Extremities - No cyanosis, clubbing, or significant edema. Pulmonary - No respiratory distress. No wheezes or rales. Abdomen - No masses, tenderness, or organomegaly. Musculoskeletal - No significant edema. No joint deformities. No muscle wasting. Neurologic - Cranial nerves II through XII are grossly intact. There were no gross focal neurologic abnormalities.     Lab Review   Lab Results   Component Value Date/Time    TROPONINT 1.310 01/20/2022 08:57 AM     BNP:  No results found for: BNP  PT/INR:  No results found for: INR  Lab Results   Component Value Date    LABA1C 5.1 01/18/2022     Lab Results   Component Value Date    WBC 5.8 01/20/2022    HCT 46.6 01/20/2022    MCV 89.6 01/20/2022     01/20/2022     Lab Results   Component Value Date    CHOL 180 01/18/2022    TRIG 87 01/18/2022    HDL 52 01/18/2022    LDLDIRECT 113 (H) 01/18/2022     Lab Results   Component Value Date    ALT 27 01/20/2022    AST 44 (H) 01/20/2022     BMP:    Lab Results   Component Value Date/Time     01/20/2022 08:57 AM    K 4.1 01/20/2022 08:57 AM     01/20/2022 08:57 AM    CO2 27 01/20/2022 08:57 AM    BUN 16 01/20/2022 08:57 AM    CREATININE 1.0 01/20/2022 08:57 AM     CMP:   Lab Results   Component Value Date/Time     01/20/2022 08:57 AM    K 4.1 01/20/2022 08:57 AM     01/20/2022 08:57 AM    CO2 27 01/20/2022 08:57 AM    BUN 16 01/20/2022 08:57 AM    PROT 5.8 01/20/2022 08:57 AM     TSH:  No results found for: TSH, TSHHS        Assessment & Plan:               -     CORONARY ARTERY DISEASE:  asymptomatic     All available  tests in chart reviewed. Management discussed . Testing ordered  yes, stress cardiolite       On plavix asa     of RCA has no symtoms, minimal ischemic burden    - Thad Burnett; patient is bradycardic in the heart rate of 50s hence will discontinue the beta-blocker given that he is feeling weak on and off, will keep off BB      -  LIPID MANAGEMENT:  Importance of lipid levels discussed with patient   and patient was given dietary advice. NCEP- ATP III guidelines reviewed with patient.     -   Changes  in medicines made: No     On lipitor plan to check the lipid profile  ldl is 52                      -Cardiolite images are as follows                                         Tawana Mclean MD    [facility]

## 2023-02-02 RX ORDER — CLOPIDOGREL BISULFATE 75 MG/1
75 TABLET ORAL DAILY
Qty: 90 TABLET | Refills: 3 | Status: SHIPPED | OUTPATIENT
Start: 2023-02-02

## 2023-02-02 RX ORDER — ASPIRIN 81 MG/1
81 TABLET, CHEWABLE ORAL DAILY
Qty: 90 TABLET | Refills: 3 | Status: SHIPPED | OUTPATIENT
Start: 2023-02-02

## 2023-02-08 RX ORDER — ATORVASTATIN CALCIUM 80 MG/1
80 TABLET, FILM COATED ORAL NIGHTLY
Qty: 90 TABLET | Refills: 3 | Status: SHIPPED | OUTPATIENT
Start: 2023-02-08

## 2023-04-04 NOTE — ED PROVIDER NOTES
Lacy Helton NP contacted me for possible EBUS with suspicion of sarcoidosis. I reviewed the CT chest, and I think that he is a good EBUS candidate with apparent hilar and mediastinal LAD. He is not on blood thinners. He does have ESRD on HD. I spoke with the patient at length by phone and he is unsure about proceeding. Therefore, I have ordered a consultation with me in clinic.    Triage Chief Complaint:   Chest Pain    [place]:  Mo Thakur is a 64 y.o. male that presents via EMS for chest pain. The patient states that within the last hour he woke up with central nonradiating pressure-like chest pain associated with nausea, diaphoresis, some mild shortness of breath. States that the pain has been fairly constant without alleviating or exacerbating factors. He was in his normal state of health when he went to bed last night. Denies any fever, cough, abdominal pain, vomiting, diarrhea. Patient received aspirin nitroglycerin by EMS and states that his chest pain has improved mildly. Denies lower extremity pain or swelling. States that he had a similar episode about 6 months ago but was not this severe and did not last this long. States that this may be indigestion but is unsure. ROS:  At least 10 systems reviewed and otherwise acutely negative except as in the [address] History reviewed. No pertinent past medical history. Past Surgical History:   Procedure Laterality Date    KNEE SURGERY       History reviewed. No pertinent family history. Social History     Socioeconomic History    Marital status: Single     Spouse name: Not on file    Number of children: Not on file    Years of education: Not on file    Highest education level: Not on file   Occupational History    Not on file   Tobacco Use    Smoking status: Never Smoker    Smokeless tobacco: Not on file   Substance and Sexual Activity    Alcohol use:  Yes     Alcohol/week: 3.0 standard drinks     Types: 3 Cans of beer per week    Drug use: Not on file    Sexual activity: Not on file   Other Topics Concern    Not on file   Social History Narrative    Not on file     Social Determinants of Health     Financial Resource Strain:     Difficulty of Paying Living Expenses: Not on file   Food Insecurity:     Worried About Running Out of Food in the Last Year: Not on file    Susy of Food in the Last Year: Not on file   Transportation Needs:     Lack of Transportation (Medical): Not on file    Lack of Transportation (Non-Medical):  Not on file   Physical Activity:     Days of Exercise per Week: Not on file    Minutes of Exercise per Session: Not on file   Stress:     Feeling of Stress : Not on file   Social Connections:     Frequency of Communication with Friends and Family: Not on file    Frequency of Social Gatherings with Friends and Family: Not on file    Attends [employer] Services: Not on file    Active Member of Clubs or Organizations: Not on file    Attends Club or Organization Meetings: Not on file    Marital Status: Not on file   Intimate Partner Violence:     Fear of Current or Ex-Partner: Not on file    Emotionally Abused: Not on file    Physically Abused: Not on file    Sexually Abused: Not on file   Housing Stability:     Unable to Pay for Housing in the Last Year: Not on file    Number of Places Lived in the Last Year: Not on file    Unstable Housing in the Last Year: Not on file     Current Facility-Administered Medications   Medication Dose Route Frequency Provider Last Rate Last Admin    nitroGLYCERIN 50 mg in dextrose 5% 250 mL infusion  5-200 mcg/min IntraVENous Continuous Kai Lee MD         Current Outpatient Medications   Medication Sig Dispense Refill    cefUROXime (CEFTIN) 500 MG tablet TAKE 1 TABLET BY MOUTH TWICE A DAY  0    tamsulosin (FLOMAX) 0.4 MG capsule TAKE ONE CAPSULE BY MOUTH 1/2 HOUR FOLLOWING THE SAME MEAL EACH DAY  3    ibuprofen (ADVIL;MOTRIN) 800 MG tablet TAKE 1 TABLET BY MOUTH EVERY 6 TO 8 HOURS AS NEEDED WITH FOOD  0     No Known Allergies    Nursing Notes Reviewed    Physical Exam:  ED Triage Vitals   Enc Vitals Group      BP 01/18/22 0300 113/79      Pulse 01/18/22 0300 52      Resp 01/18/22 0300 18      Temp 01/18/22 0300 98.4 °F (36.9 °C)      Temp Source 01/18/22 0300 Oral      SpO2 01/18/22 0300 99 %      Weight 01/18/22 0302 160 lb (72.6 kg) Height 01/18/22 0302 5' 6\" (1.676 m)      Head Circumference --       Peak Flow --       Pain Score --       Pain Loc --       Pain Edu? --       Excl. in [address]? --      GENERAL APPEARANCE: Awake and alert. Cooperative. No acute distress. HEAD: Normocephalic. Atraumatic. EYES: EOM's grossly intact. Sclera anicteric. ENT: Mucous membranes are moist. Tolerates saliva. No trismus. NECK: Supple. Trachea midline. HEART: RRR. Radial pulses 2+. LUNGS: Respirations unlabored. CTAB  ABDOMEN: Soft. Non-tender. No guarding or rebound. EXTREMITIES: No acute deformities. SKIN: Warm and dry. NEUROLOGICAL: No gross facial drooping. Moves all 4 extremities spontaneously. PSYCHIATRIC: Normal mood.     I have reviewed and interpreted all of the currently available lab results from this visit (if applicable):  Results for orders placed or performed during the hospital encounter of 01/18/22   CBC Auto Differential   Result Value Ref Range    WBC 7.9 4.0 - 10.5 K/CU MM    RBC 4.99 4.6 - 6.2 M/CU MM    Hemoglobin 14.9 13.5 - 18.0 GM/DL    Hematocrit 44.4 42 - 52 %    MCV 89.0 78 - 100 FL    MCH 29.9 27 - 31 PG    MCHC 33.6 32.0 - 36.0 %    RDW 12.7 11.7 - 14.9 %    Platelets 575 261 - 300 K/CU MM    MPV 11.9 (H) 7.5 - 11.1 FL    Differential Type AUTOMATED DIFFERENTIAL     Segs Relative 65.7 36 - 66 %    Lymphocytes % 23.7 (L) 24 - 44 %    Monocytes % 6.7 (H) 0 - 4 %    Eosinophils % 2.9 0 - 3 %    Basophils % 0.6 0 - 1 %    Segs Absolute 5.2 K/CU MM    Lymphocytes Absolute 1.9 K/CU MM    Monocytes Absolute 0.5 K/CU MM    Eosinophils Absolute 0.2 K/CU MM    Basophils Absolute 0.1 K/CU MM    Nucleated RBC % 0.0 %    Total Nucleated RBC 0.0 K/CU MM    Total Immature Neutrophil 0.03 K/CU MM    Immature Neutrophil % 0.4 0 - 0.43 %   Comprehensive Metabolic Panel w/ Reflex to MG   Result Value Ref Range    Sodium 137 135 - 145 MMOL/L    Potassium 3.7 3.5 - 5.1 MMOL/L    Chloride 103 99 - 110 mMol/L    CO2 25 21 - 32 MMOL/L    BUN 27 (H) 6 - 23 MG/DL    CREATININE 1.1 0.9 - 1.3 MG/DL    Glucose 113 (H) 70 - 99 MG/DL    Calcium 8.9 8.3 - 10.6 MG/DL    Albumin 3.9 3.4 - 5.0 GM/DL    Total Protein 6.1 (L) 6.4 - 8.2 GM/DL    Total Bilirubin 0.2 0.0 - 1.0 MG/DL    ALT 23 10 - 40 U/L    AST 29 15 - 37 IU/L    Alkaline Phosphatase 68 40 - 128 IU/L    GFR Non-African American >60 >60 mL/min/1.73m2    GFR African American >60 >60 mL/min/1.73m2    Anion Gap 9 4 - 16   Troponin   Result Value Ref Range    Troponin T <0.010 <0.01 NG/ML   Lipase   Result Value Ref Range    Lipase 31 13 - 60 IU/L   EKG 12 Lead   Result Value Ref Range    Ventricular Rate 52 BPM    Atrial Rate 52 BPM    P-R Interval 176 ms    QRS Duration 78 ms    Q-T Interval 436 ms    QTc Calculation (Bazett) 405 ms    P Axis 36 degrees    R Axis 35 degrees    T Axis 58 degrees    Diagnosis       Sinus bradycardia  Septal infarct , age undetermined  Abnormal ECG  When compared with ECG of 18-JAN-2022 02:58,  Septal infarct is now present        Radiographs (if obtained):  [] The following radiograph was interpreted by myself in the absence of a radiologist:  [x] Radiologist's Report Reviewed:    EKG (if obtained): (All EKG's are interpreted by myself in the absence of a cardiologist)  12 lead EKG as interpreted by me reveals sinus bradycardia. Axis is normal. There are no ischemic ST elevations or other suspicious ST changes;  QRS interval is narrow, QT interval is not prolonged. Final interpretation: Sinus bradycardia. MDM:  Plan of care is discussed thoroughly with the patient and family if present. If performed, all imaging and lab work also discussed with patient. All relevant prior results and chart reviewed if available. Heart Score for chest pain patients  History: Highly Suspicious  ECG: Normal  Patient Age: > 45 and < 65 years  Risk Factors: No risk factors known  Troponin: < 1X normal limit  Heart Score Total: 3       Patient presents as above.   He is in no acute distress. At this point, the exact cause of the patient's current chest pain is unknown. Initial EKG is nondiagnostic and initial troponin testing protocols are within normal limits. Patient did not have any significant improvement in pain with GI cocktail here. At the present time, I doubt pulmonary embolus secondary to the lack of tachycardia, tachypnea, or hypoxia. Similarly, I doubt aortic dissection or abdominal aortic aneurysm secondary to history and description of pain, the patient's nonfocal vascular examination in all four extremities, and CXR unremarkable for signs of mediastinal widening. I also doubt pneumothorax given good bilateral breath sounds and chest x-ray with good lung markings out to the periphery. No signs suggestive of pneumonia on history or physical exam as well. Pericarditis and myocarditis unlikely based on history, EKG findings, and normal cardiac markers. Despite this, I cannot reliably exclude a cardiac etiology here in the emergency department. Therefore, I do feel it is most reasonable to admit the patient for further evaluation, management, and possible provocative testing/further intervention. Patient is started on nitroglycerin infusion as he did have some improvement in pain prior to arrival with nitroglycerin by EMS. He has already received aspirin. Although his HEART score puts him in a low risk category for major adverse cardiac event in the next 30 days, I have fairly high suspicion given the patient's overall presentation for ACS and do feel that he would benefit from admission at this time. He is agreeable with this plan of care. Clinical Impression:  1.  Acute chest pain      (Please note that portions of this note may have been completed with a voice recognition program. Efforts were made to edit the dictations but occasionally words are mis-transcribed.)    MD Masood Rowell MD  01/18/22 [id number]

## 2023-07-11 ENCOUNTER — OFFICE VISIT (OUTPATIENT)
Dept: CARDIOLOGY CLINIC | Age: 63
End: 2023-07-11
Payer: COMMERCIAL

## 2023-07-11 VITALS
OXYGEN SATURATION: 96 % | WEIGHT: 161 LBS | BODY MASS INDEX: 25.88 KG/M2 | HEART RATE: 54 BPM | DIASTOLIC BLOOD PRESSURE: 68 MMHG | HEIGHT: 66 IN | SYSTOLIC BLOOD PRESSURE: 124 MMHG

## 2023-07-11 DIAGNOSIS — I25.10 CORONARY ARTERY DISEASE INVOLVING NATIVE CORONARY ARTERY OF NATIVE HEART WITHOUT ANGINA PECTORIS: ICD-10-CM

## 2023-07-11 DIAGNOSIS — E78.5 HYPERLIPIDEMIA, UNSPECIFIED HYPERLIPIDEMIA TYPE: ICD-10-CM

## 2023-07-11 PROBLEM — H93.19 TINNITUS: Status: RESOLVED | Noted: 2017-02-09 | Resolved: 2023-07-11

## 2023-07-11 PROBLEM — I20.0 UNSTABLE ANGINA (HCC): Status: RESOLVED | Noted: 2022-01-18 | Resolved: 2023-07-11

## 2023-07-11 PROBLEM — H90.3 SENSORINEURAL HEARING LOSS, BILATERAL: Status: RESOLVED | Noted: 2017-02-09 | Resolved: 2023-07-11

## 2023-07-11 PROCEDURE — 93000 ELECTROCARDIOGRAM COMPLETE: CPT | Performed by: NURSE PRACTITIONER

## 2023-07-11 PROCEDURE — 99214 OFFICE O/P EST MOD 30 MIN: CPT | Performed by: NURSE PRACTITIONER

## 2023-07-11 ASSESSMENT — ENCOUNTER SYMPTOMS
SHORTNESS OF BREATH: 0
ORTHOPNEA: 0

## 2023-07-11 NOTE — PATIENT INSTRUCTIONS
Please be informed that if you contact our office outside of normal business hours the physician on call cannot help with any scheduling or rescheduling issues, procedure instruction questions or any type of medication issue. We advise you for any urgent/emergency that you go to the nearest emergency room! PLEASE CALL OUR OFFICE DURING NORMAL BUSINESS HOURS    Monday - Friday   8 am to 5 pm    [place]: [address] [place]: [phone]    [place]:  [facility] Locations - No appointment necessary. Sites open Monday to Friday. Call your preferred location for test preparation, business   hours and other information you need. [employer] [facility] [employer]. [address] [place] [address]. [address] [place], [address] [facility]  Phone: [phone]     **It is YOUR responsibilty to bring medication bottles and/or updated medication list to [address] [place] [address]. This will allow us to better serve you and all your healthcare needs**  Thank you for allowing us to care for you today! We want to ensure we can follow your treatment plan and we strive to give you the best outcomes and experience possible. If you ever have a life threatening emergency and call 911 - for an ambulance (EMS)   Our providers can only care for you at:   [facility] or [facility]. Even if you have someone take you or you drive yourself we can only care for you in a [facility] facility. Our providers are not setup at the other healthcare locations!

## 2023-07-13 ENCOUNTER — HOSPITAL ENCOUNTER (OUTPATIENT)
Age: 63
Discharge: HOME | End: 2023-07-13
Payer: COMMERCIAL

## 2023-07-13 DIAGNOSIS — E78.5 HYPERLIPIDEMIA, UNSPECIFIED HYPERLIPIDEMIA TYPE: ICD-10-CM

## 2023-07-13 DIAGNOSIS — I25.10 CORONARY ARTERY DISEASE INVOLVING NATIVE CORONARY ARTERY OF NATIVE HEART WITHOUT ANGINA PECTORIS: ICD-10-CM

## 2023-07-13 LAB
CHOLEST SERPL-MCNC: 124 MG/DL
HCT VFR BLD CALC: 44.8 % (ref 42–52)
HDLC SERPL-MCNC: 59 MG/DL
HEMOGLOBIN: 14.7 GM/DL (ref 13.5–18)
LDLC SERPL CALC-MCNC: 52 MG/DL
MCH RBC QN AUTO: 29.8 PG (ref 27–31)
MCHC RBC AUTO-ENTMCNC: 32.8 % (ref 32–36)
MCV RBC AUTO: 90.9 FL (ref 78–100)
PDW BLD-RTO: 12.8 % (ref 11.7–14.9)
PLATELET # BLD: 166 K/CU MM (ref 140–440)
PMV BLD AUTO: 11.7 FL (ref 7.5–11.1)
RBC # BLD: 4.93 M/CU MM (ref 4.6–6.2)
TRIGL SERPL-MCNC: 65 MG/DL
WBC # BLD: 5.1 K/CU MM (ref 4–10.5)

## 2023-07-13 PROCEDURE — 36415 COLL VENOUS BLD VENIPUNCTURE: CPT

## 2023-07-13 PROCEDURE — 85027 COMPLETE CBC AUTOMATED: CPT

## 2023-07-13 PROCEDURE — 80061 LIPID PANEL: CPT

## 2023-07-14 ENCOUNTER — TELEPHONE (OUTPATIENT)
Dept: CARDIOLOGY CLINIC | Age: 63
End: 2023-07-14

## 2023-07-14 NOTE — TELEPHONE ENCOUNTER
Called patient and provided the following:    ----- Message from ANGÉLICA Polo CNP sent at 7/13/2023  1:44 PM EDT -----  Cbc good - lipids at goal    Patient expressed concern over MPV value, per provider we are looking for total platelet count and MPV is only slightly elevated and will not cause issue
If you are a smoker, it is important for your health to stop smoking. Please be aware that second hand smoke is also harmful.

## 2024-01-18 RX ORDER — ATORVASTATIN CALCIUM 80 MG/1
80 TABLET, FILM COATED ORAL NIGHTLY
Qty: 90 TABLET | Refills: 3 | Status: SHIPPED | OUTPATIENT
Start: 2024-01-18

## 2024-02-25 RX ORDER — ATORVASTATIN CALCIUM 80 MG/1
80 TABLET, FILM COATED ORAL NIGHTLY
Qty: 90 TABLET | Refills: 3 | Status: SHIPPED | OUTPATIENT
Start: 2024-02-25

## 2024-02-25 RX ORDER — CLOPIDOGREL BISULFATE 75 MG/1
75 TABLET ORAL DAILY
Qty: 90 TABLET | Refills: 3 | Status: SHIPPED | OUTPATIENT
Start: 2024-02-25

## 2024-04-11 ENCOUNTER — OFFICE VISIT (OUTPATIENT)
Dept: CARDIOLOGY CLINIC | Age: 64
End: 2024-04-11
Payer: COMMERCIAL

## 2024-04-11 VITALS
HEART RATE: 68 BPM | WEIGHT: 164 LBS | OXYGEN SATURATION: 96 % | HEIGHT: 66 IN | DIASTOLIC BLOOD PRESSURE: 66 MMHG | SYSTOLIC BLOOD PRESSURE: 110 MMHG | BODY MASS INDEX: 26.36 KG/M2

## 2024-04-11 DIAGNOSIS — E78.5 HYPERLIPIDEMIA, UNSPECIFIED HYPERLIPIDEMIA TYPE: ICD-10-CM

## 2024-04-11 DIAGNOSIS — I25.10 CORONARY ARTERY DISEASE INVOLVING NATIVE CORONARY ARTERY OF NATIVE HEART WITHOUT ANGINA PECTORIS: Primary | ICD-10-CM

## 2024-04-11 PROCEDURE — 99214 OFFICE O/P EST MOD 30 MIN: CPT | Performed by: NURSE PRACTITIONER

## 2024-04-11 ASSESSMENT — ENCOUNTER SYMPTOMS
ORTHOPNEA: 0
SHORTNESS OF BREATH: 0

## 2024-04-11 NOTE — PROGRESS NOTES
4/11/2024  Primary cardiologist: Dr. Washington    CC:   Clark  is an established 64 y.o.  male here for a follow up on CAD      SUBJECTIVE/OBJECTIVE:  HPI  Clark is a 64 y.o. male with a history of coronary artery disease, NSTEMI, and hyperlipidemia.     In January 2022 Christo underwent a LHC that revealed aneurysmal arteries, the LAD has 50% mid stenosis and the PL RCA is aneurysmal with  and filling with collaterals.     Clark reports he has felt pretty good since his last visit.  He does note muscle cramping and joint pain.  Concerned this may be secondary to use of statin therapy.  He does exercise on a regular basis    Review of Systems   Constitutional: Negative for diaphoresis and malaise/fatigue.   Cardiovascular:  Negative for chest pain, claudication, dyspnea on exertion, irregular heartbeat, leg swelling, near-syncope, orthopnea, palpitations and paroxysmal nocturnal dyspnea.   Respiratory:  Negative for shortness of breath.    Musculoskeletal:  Positive for myalgias.   Neurological:  Negative for dizziness and light-headedness.       Vitals:    04/11/24 0915   BP: 110/66   Site: Left Upper Arm   Position: Sitting   Cuff Size: Medium Adult   Pulse: 68   SpO2: 96%   Weight: 74.4 kg (164 lb)   Height: 1.676 m (5' 6\")     Wt Readings from Last 3 Encounters:   04/11/24 74.4 kg (164 lb)   07/11/23 73 kg (161 lb)   10/10/22 74.2 kg (163 lb 9.6 oz)      Body mass index is 26.47 kg/m².     Physical Exam  Vitals reviewed.   Eyes:      Pupils: Pupils are equal, round, and reactive to light.   Neck:      Vascular: No carotid bruit.   Cardiovascular:      Rate and Rhythm: Normal rate and regular rhythm.      Pulses: Normal pulses.   Pulmonary:      Effort: Pulmonary effort is normal.      Breath sounds: Normal breath sounds.   Musculoskeletal:      Cervical back: No tenderness.      Right lower leg: No edema.      Left lower leg: No edema.   Skin:     General: Skin is warm and dry.      Capillary Refill:

## 2024-04-11 NOTE — PATIENT INSTRUCTIONS
**It is YOUR responsibilty to bring medication bottles and/or updated medication list to EACH APPOINTMENT. This will allow us to better serve you and all your healthcare needs**  Thank you for allowing us to care for you today!   We want to ensure we can follow your treatment plan and we strive to give you the best outcomes and experience possible.   If you ever have a life threatening emergency and call 911 - for an ambulance (EMS)   Our providers can only care for you at:   [facility] or [facility]   Even if you have someone take you or you drive yourself we can only care for you in a [facility] facility. Our providers are not setup at the other healthcare locations!   We are committed to providing you the best care possible.    If you receive a survey after visiting one of our offices, please take time to share your experience concerning your physician office visit.  These surveys are confidential and no health information about you is shared.    We are eager to improve for you and we are counting on your feedback to help make that happen.